# Patient Record
Sex: MALE | Race: WHITE | NOT HISPANIC OR LATINO | Employment: FULL TIME | URBAN - METROPOLITAN AREA
[De-identification: names, ages, dates, MRNs, and addresses within clinical notes are randomized per-mention and may not be internally consistent; named-entity substitution may affect disease eponyms.]

---
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
## 2021-01-02 ENCOUNTER — HOSPITAL ENCOUNTER (OUTPATIENT)
Facility: HOSPITAL | Age: 21
Setting detail: OBSERVATION
Discharge: HOME | End: 2021-01-05
Attending: EMERGENCY MEDICINE | Admitting: INTERNAL MEDICINE

## 2021-01-02 ENCOUNTER — APPOINTMENT (OUTPATIENT)
Dept: GENERAL RADIOLOGY | Facility: HOSPITAL | Age: 21
End: 2021-01-02

## 2021-01-02 DIAGNOSIS — J45.51 SEVERE PERSISTENT ASTHMA WITH ACUTE EXACERBATION: ICD-10-CM

## 2021-01-02 DIAGNOSIS — R06.03 ACUTE RESPIRATORY DISTRESS: Primary | ICD-10-CM

## 2021-01-02 LAB
ALBUMIN SERPL-MCNC: 4.5 G/DL (ref 3.5–5.2)
ALBUMIN/GLOB SERPL: 2.4 G/DL
ALP SERPL-CCNC: 71 U/L (ref 39–117)
ALT SERPL W P-5'-P-CCNC: 12 U/L (ref 1–41)
ANION GAP SERPL CALCULATED.3IONS-SCNC: 10 MMOL/L (ref 5–15)
AST SERPL-CCNC: 20 U/L (ref 1–40)
BASOPHILS # BLD AUTO: 0 10*3/MM3 (ref 0–0.2)
BASOPHILS NFR BLD AUTO: 0.1 % (ref 0–1.5)
BILIRUB SERPL-MCNC: 0.6 MG/DL (ref 0–1.2)
BUN SERPL-MCNC: 6 MG/DL (ref 6–20)
BUN/CREAT SERPL: 7.8 (ref 7–25)
CALCIUM SPEC-SCNC: 8.9 MG/DL (ref 8.6–10.5)
CHLORIDE SERPL-SCNC: 104 MMOL/L (ref 98–107)
CO2 SERPL-SCNC: 24 MMOL/L (ref 22–29)
CREAT SERPL-MCNC: 0.77 MG/DL (ref 0.76–1.27)
DEPRECATED RDW RBC AUTO: 42.9 FL (ref 37–54)
EOSINOPHIL # BLD AUTO: 0.1 10*3/MM3 (ref 0–0.4)
EOSINOPHIL NFR BLD AUTO: 1.3 % (ref 0.3–6.2)
ERYTHROCYTE [DISTWIDTH] IN BLOOD BY AUTOMATED COUNT: 12.7 % (ref 12.3–15.4)
GFR SERPL CREATININE-BSD FRML MDRD: 130 ML/MIN/1.73
GLOBULIN UR ELPH-MCNC: 1.9 GM/DL
GLUCOSE SERPL-MCNC: 186 MG/DL (ref 65–99)
HCT VFR BLD AUTO: 43.7 % (ref 37.5–51)
HGB BLD-MCNC: 15 G/DL (ref 13–17.7)
LYMPHOCYTES # BLD AUTO: 0.4 10*3/MM3 (ref 0.7–3.1)
LYMPHOCYTES NFR BLD AUTO: 7.4 % (ref 19.6–45.3)
MCH RBC QN AUTO: 33 PG (ref 26.6–33)
MCHC RBC AUTO-ENTMCNC: 34.3 G/DL (ref 31.5–35.7)
MCV RBC AUTO: 96.3 FL (ref 79–97)
MONOCYTES # BLD AUTO: 0.1 10*3/MM3 (ref 0.1–0.9)
MONOCYTES NFR BLD AUTO: 1.5 % (ref 5–12)
NEUTROPHILS NFR BLD AUTO: 4.9 10*3/MM3 (ref 1.7–7)
NEUTROPHILS NFR BLD AUTO: 89.7 % (ref 42.7–76)
NRBC BLD AUTO-RTO: 0 /100 WBC (ref 0–0.2)
PLATELET # BLD AUTO: 199 10*3/MM3 (ref 140–450)
PMV BLD AUTO: 8.8 FL (ref 6–12)
POTASSIUM SERPL-SCNC: 3.6 MMOL/L (ref 3.5–5.2)
PROT SERPL-MCNC: 6.4 G/DL (ref 6–8.5)
RBC # BLD AUTO: 4.54 10*6/MM3 (ref 4.14–5.8)
SARS-COV-2 RNA PNL SPEC NAA+PROBE: NORMAL
SODIUM SERPL-SCNC: 138 MMOL/L (ref 136–145)
WBC # BLD AUTO: 5.4 10*3/MM3 (ref 3.4–10.8)

## 2021-01-02 PROCEDURE — 85025 COMPLETE CBC W/AUTO DIFF WBC: CPT | Performed by: EMERGENCY MEDICINE

## 2021-01-02 PROCEDURE — 94799 UNLISTED PULMONARY SVC/PX: CPT

## 2021-01-02 PROCEDURE — G0378 HOSPITAL OBSERVATION PER HR: HCPCS

## 2021-01-02 PROCEDURE — 25010000002 EPINEPHRINE PER 0.1 MG: Performed by: EMERGENCY MEDICINE

## 2021-01-02 PROCEDURE — 25010000002 MAGNESIUM SULFATE 2 GM/50ML SOLUTION: Performed by: EMERGENCY MEDICINE

## 2021-01-02 PROCEDURE — 80053 COMPREHEN METABOLIC PANEL: CPT | Performed by: EMERGENCY MEDICINE

## 2021-01-02 PROCEDURE — 71045 X-RAY EXAM CHEST 1 VIEW: CPT

## 2021-01-02 PROCEDURE — 96375 TX/PRO/DX INJ NEW DRUG ADDON: CPT

## 2021-01-02 PROCEDURE — 87635 SARS-COV-2 COVID-19 AMP PRB: CPT | Performed by: EMERGENCY MEDICINE

## 2021-01-02 PROCEDURE — 96372 THER/PROPH/DIAG INJ SC/IM: CPT

## 2021-01-02 PROCEDURE — C9803 HOPD COVID-19 SPEC COLLECT: HCPCS

## 2021-01-02 PROCEDURE — 94640 AIRWAY INHALATION TREATMENT: CPT

## 2021-01-02 PROCEDURE — 25010000002 METHYLPREDNISOLONE PER 125 MG: Performed by: EMERGENCY MEDICINE

## 2021-01-02 PROCEDURE — 99285 EMERGENCY DEPT VISIT HI MDM: CPT

## 2021-01-02 PROCEDURE — 96365 THER/PROPH/DIAG IV INF INIT: CPT

## 2021-01-02 RX ORDER — ACETAMINOPHEN 325 MG/1
650 TABLET ORAL EVERY 6 HOURS PRN
Status: DISCONTINUED | OUTPATIENT
Start: 2021-01-02 | End: 2021-01-05 | Stop reason: HOSPADM

## 2021-01-02 RX ORDER — CALCIUM CARBONATE 200(500)MG
2 TABLET,CHEWABLE ORAL 3 TIMES DAILY PRN
Status: DISCONTINUED | OUTPATIENT
Start: 2021-01-02 | End: 2021-01-05 | Stop reason: HOSPADM

## 2021-01-02 RX ORDER — EPINEPHRINE 1 MG/ML
0.3 INJECTION, SOLUTION, CONCENTRATE INTRAVENOUS ONCE
Status: COMPLETED | OUTPATIENT
Start: 2021-01-02 | End: 2021-01-02

## 2021-01-02 RX ORDER — MAGNESIUM SULFATE HEPTAHYDRATE 40 MG/ML
2 INJECTION, SOLUTION INTRAVENOUS ONCE
Status: COMPLETED | OUTPATIENT
Start: 2021-01-02 | End: 2021-01-02

## 2021-01-02 RX ORDER — ZOLPIDEM TARTRATE 5 MG/1
5 TABLET ORAL NIGHTLY PRN
Status: DISCONTINUED | OUTPATIENT
Start: 2021-01-02 | End: 2021-01-05 | Stop reason: HOSPADM

## 2021-01-02 RX ORDER — METHYLPREDNISOLONE SODIUM SUCCINATE 125 MG/2ML
125 INJECTION, POWDER, LYOPHILIZED, FOR SOLUTION INTRAMUSCULAR; INTRAVENOUS ONCE
Status: COMPLETED | OUTPATIENT
Start: 2021-01-02 | End: 2021-01-02

## 2021-01-02 RX ORDER — IPRATROPIUM BROMIDE AND ALBUTEROL SULFATE 2.5; .5 MG/3ML; MG/3ML
3 SOLUTION RESPIRATORY (INHALATION) EVERY 4 HOURS
COMMUNITY
End: 2023-03-23

## 2021-01-02 RX ORDER — BUDESONIDE AND FORMOTEROL FUMARATE DIHYDRATE 160; 4.5 UG/1; UG/1
2 AEROSOL RESPIRATORY (INHALATION)
Status: DISCONTINUED | OUTPATIENT
Start: 2021-01-02 | End: 2021-01-03

## 2021-01-02 RX ORDER — SODIUM CHLORIDE 0.9 % (FLUSH) 0.9 %
10 SYRINGE (ML) INJECTION AS NEEDED
Status: DISCONTINUED | OUTPATIENT
Start: 2021-01-02 | End: 2021-01-05 | Stop reason: HOSPADM

## 2021-01-02 RX ORDER — IPRATROPIUM BROMIDE AND ALBUTEROL SULFATE 2.5; .5 MG/3ML; MG/3ML
3 SOLUTION RESPIRATORY (INHALATION) EVERY 4 HOURS
Status: DISCONTINUED | OUTPATIENT
Start: 2021-01-02 | End: 2021-01-03

## 2021-01-02 RX ORDER — METHYLPREDNISOLONE SODIUM SUCCINATE 125 MG/2ML
60 INJECTION, POWDER, LYOPHILIZED, FOR SOLUTION INTRAMUSCULAR; INTRAVENOUS EVERY 8 HOURS
Status: DISCONTINUED | OUTPATIENT
Start: 2021-01-03 | End: 2021-01-04

## 2021-01-02 RX ORDER — IPRATROPIUM BROMIDE AND ALBUTEROL SULFATE 2.5; .5 MG/3ML; MG/3ML
3 SOLUTION RESPIRATORY (INHALATION) EVERY 4 HOURS
Status: DISCONTINUED | OUTPATIENT
Start: 2021-01-02 | End: 2021-01-02

## 2021-01-02 RX ORDER — ONDANSETRON 2 MG/ML
4 INJECTION INTRAMUSCULAR; INTRAVENOUS EVERY 6 HOURS PRN
Status: DISCONTINUED | OUTPATIENT
Start: 2021-01-02 | End: 2021-01-05 | Stop reason: HOSPADM

## 2021-01-02 RX ORDER — ALBUTEROL SULFATE 2.5 MG/3ML
2.5 SOLUTION RESPIRATORY (INHALATION)
Status: COMPLETED | OUTPATIENT
Start: 2021-01-02 | End: 2021-01-02

## 2021-01-02 RX ORDER — IPRATROPIUM BROMIDE AND ALBUTEROL SULFATE 2.5; .5 MG/3ML; MG/3ML
3 SOLUTION RESPIRATORY (INHALATION) EVERY 4 HOURS PRN
Status: DISCONTINUED | OUTPATIENT
Start: 2021-01-02 | End: 2021-01-03

## 2021-01-02 RX ORDER — IPRATROPIUM BROMIDE AND ALBUTEROL SULFATE 2.5; .5 MG/3ML; MG/3ML
3 SOLUTION RESPIRATORY (INHALATION) ONCE
Status: COMPLETED | OUTPATIENT
Start: 2021-01-02 | End: 2021-01-02

## 2021-01-02 RX ORDER — IPRATROPIUM/ALBUTEROL SULFATE 20-100 MCG
1 MIST INHALER (GRAM) INHALATION 2 TIMES DAILY PRN
COMMUNITY
Start: 2020-12-21 | End: 2021-01-05 | Stop reason: HOSPADM

## 2021-01-02 RX ORDER — ALBUTEROL SULFATE 2.5 MG/3ML
2.5 SOLUTION RESPIRATORY (INHALATION) ONCE
Status: COMPLETED | OUTPATIENT
Start: 2021-01-02 | End: 2021-01-02

## 2021-01-02 RX ADMIN — ALBUTEROL SULFATE 2.5 MG: 2.5 SOLUTION RESPIRATORY (INHALATION) at 16:24

## 2021-01-02 RX ADMIN — EPINEPHRINE 0.3 MG: 1 INJECTION, SOLUTION, CONCENTRATE INTRAVENOUS at 20:33

## 2021-01-02 RX ADMIN — METHYLPREDNISOLONE SODIUM SUCCINATE 125 MG: 125 INJECTION, POWDER, FOR SOLUTION INTRAMUSCULAR; INTRAVENOUS at 18:32

## 2021-01-02 RX ADMIN — ALBUTEROL SULFATE 2.5 MG: 2.5 SOLUTION RESPIRATORY (INHALATION) at 20:21

## 2021-01-02 RX ADMIN — SODIUM CHLORIDE 1000 ML: 9 INJECTION, SOLUTION INTRAVENOUS at 18:13

## 2021-01-02 RX ADMIN — ALBUTEROL SULFATE 2.5 MG: 2.5 SOLUTION RESPIRATORY (INHALATION) at 16:22

## 2021-01-02 RX ADMIN — IPRATROPIUM BROMIDE AND ALBUTEROL SULFATE 3 ML: 2.5; .5 SOLUTION RESPIRATORY (INHALATION) at 16:22

## 2021-01-02 RX ADMIN — IPRATROPIUM BROMIDE AND ALBUTEROL SULFATE 3 ML: 2.5; .5 SOLUTION RESPIRATORY (INHALATION) at 21:33

## 2021-01-02 RX ADMIN — IPRATROPIUM BROMIDE AND ALBUTEROL SULFATE 3 ML: 2.5; .5 SOLUTION RESPIRATORY (INHALATION) at 23:26

## 2021-01-02 RX ADMIN — IPRATROPIUM BROMIDE AND ALBUTEROL SULFATE 3 ML: 2.5; .5 SOLUTION RESPIRATORY (INHALATION) at 18:13

## 2021-01-02 RX ADMIN — MAGNESIUM SULFATE HEPTAHYDRATE 2 G: 40 INJECTION, SOLUTION INTRAVENOUS at 16:12

## 2021-01-02 NOTE — ED NOTES
PT states improvment in SOB.  Pt receiving breathing tx at this time.     Neno Treadwell LPN  01/02/21 1640

## 2021-01-02 NOTE — ED PROVIDER NOTES
Subjective   Chief complaint shortness of breath    History of present illness this is a 20-year-old male with a history of asthma who states that he has had 1 day history of increasing asthma.  He denies any fever chills or sweats has had a sort of a nonproductive cough for about a week.  No body aches no ill exposures or foreign travels no leg pain or swelling no chest pain neck arm or jaw pain.  Today uses inhalers without relief.  Symptoms are moderate worse with movement better with rest and ongoing just today over the last 5 or 6 hours.  No chest pain.  The patient states that this happens to him every once while work he has this flare that he cannot get under control.          Review of Systems   Constitutional: Negative for chills and fever.   HENT: Negative for congestion and sinus pressure.    Eyes: Negative for photophobia and visual disturbance.   Respiratory: Positive for cough and shortness of breath. Negative for chest tightness.    Cardiovascular: Negative for chest pain and leg swelling.   Gastrointestinal: Negative for abdominal pain and vomiting.   Endocrine: Negative for cold intolerance and heat intolerance.   Genitourinary: Negative for difficulty urinating and dysuria.   Musculoskeletal: Negative for arthralgias and back pain.   Skin: Negative for color change and pallor.   Neurological: Negative for dizziness and light-headedness.   Psychiatric/Behavioral: Negative for agitation and behavioral problems.       Past Medical History:   Diagnosis Date   • Asthma    • COPD (chronic obstructive pulmonary disease) (CMS/[facility])        No Known Allergies    Past Surgical History:   Procedure Laterality Date   • EYE SURGERY         No family history on file.    Social History     Socioeconomic History   • Marital status: Single     Spouse name: Not on file   • Number of children: Not on file   • Years of education: Not on file   • Highest education level: Not on file   Tobacco Use   • Smoking status:  Never Smoker   • Smokeless tobacco: Never Used   Substance and Sexual Activity   • Alcohol use: Not Currently     Frequency: Never   • Drug use: Yes     Types: Marijuana   • Sexual activity: Defer   Social History Narrative    ** Merged History Encounter **            Prior to Admission medications    Medication Sig Start Date End Date Taking? Authorizing Provider   albuterol sulfate  (90 Base) MCG/ACT inhaler Inhale 2 puffs Every 6 (Six) Hours As Needed for Wheezing or Shortness of Air. 8/6/20   Rudy Liao MD   albuterol sulfate  (90 Base) MCG/ACT inhaler Inhale 2 puffs Every 4 (Four) Hours As Needed for Wheezing or Shortness of Air. 9/23/20   Gregorio Linares MD   budesonide-formoterol (SYMBICORT) 160-4.5 MCG/ACT inhaler Inhale 2 puffs 2 (Two) Times a Day. 9/23/20   Gregorio Linares MD   cephalexin (KEFLEX) 500 MG capsule Take 1 capsule by mouth 3 (Three) Times a Day. 8/6/20   Rudy Liao MD   ipratropium-albuterol (DUO-NEB) 0.5-2.5 mg/3 ml nebulizer Take 3 mL by nebulization 4 (Four) Times a Day for 25 doses. 9/26/20 10/3/20  Crow Barger MD   methylPREDNISolone (MEDROL) 4 MG dose pack Take as directed on package instructions. 9/23/20   Gregorio Linares MD   methylPREDNISolone (MEDROL) 4 MG dose pack Take as directed on package instructions. 9/26/20   Crow Barger MD   naproxen (NAPROSYN) 375 MG tablet Take 1 tablet by mouth 2 (Two) Times a Day As Needed for Moderate Pain . 11/22/20   Jsoe Armando Aquino MD   traMADol (ULTRAM) 50 MG tablet Take 1 tablet by mouth Every 6 (Six) Hours As Needed for Moderate Pain  or Severe Pain . 8/6/20   Rudy Liao MD     Patient is only on inhalers currently    Objective   Physical Exam  20-year-old awake alert moderate distress HEENT extraocular muscles intact pupils equal reactive mouth clear without exudate no stridor neck supple no adenopathy no JVD lungs diffuse scattered wheezes throughout no retractions heart regular without  murmur abdomen soft without tenderness no pulsatile masses extremities no edema cords or Homans' sign no evidence of DVT.  Patient's awake alert and follows commands motor strength normal without focal weakness  Procedures           ED Course      Results for orders placed or performed during the hospital encounter of 01/02/21   COVID-19, ABBOTT IN-HOUSE,NASAL Swab (NO TRANSPORT MEDIA) 2 HR TAT - Swab, Nasopharynx    Specimen: Nasopharynx; Swab   Result Value Ref Range    COVID19 Presumptive Negative Presumptive Negative - Ref. Range     Xr Chest 1 View    Result Date: 1/2/2021   1. No acute cardiopulmonary disease.   Electronically Signed By-Gibran Wright MD On:1/2/2021 5:13 PM This report was finalized on [id number] by  Gibran Wright MD.    Medications   sodium chloride 0.9 % flush 10 mL (has no administration in time range)   budesonide-formoterol (SYMBICORT) 160-4.5 MCG/ACT inhaler 2 puff (0 puffs Inhalation Hold 1/2/21 2033)   methylPREDNISolone sodium succinate (SOLU-Medrol) injection 60 mg (has no administration in time range)   acetaminophen (TYLENOL) tablet 650 mg (has no administration in time range)   ondansetron (ZOFRAN) injection 4 mg (has no administration in time range)   zolpidem (AMBIEN) tablet 5 mg (has no administration in time range)   calcium carbonate (TUMS) chewable tablet 500 mg (200 mg elemental) (has no administration in time range)   ipratropium-albuterol (DUO-NEB) nebulizer solution 3 mL (has no administration in time range)   ipratropium-albuterol (DUO-NEB) nebulizer solution 3 mL (has no administration in time range)   ipratropium-albuterol (DUO-NEB) nebulizer solution 3 mL (3 mL Nebulization Given 1/2/21 1622)   albuterol (PROVENTIL) nebulizer solution 0.083% 2.5 mg/3mL (2.5 mg Nebulization Given 1/2/21 1624)   magnesium sulfate 2g/50 mL (PREMIX) infusion (0 g Intravenous Stopped 1/2/21 1730)   ipratropium-albuterol (DUO-NEB) nebulizer solution 3 mL (3 mL Nebulization Given  1/2/21 1813)   sodium chloride 0.9 % bolus 1,000 mL (0 mL Intravenous Stopped 1/2/21 1911)   methylPREDNISolone sodium succinate (SOLU-Medrol) injection 125 mg (125 mg Intravenous Given 1/2/21 1832)   albuterol (PROVENTIL) nebulizer solution 0.083% 2.5 mg/3mL (2.5 mg Nebulization Given 1/2/21 2021)   EPINEPHrine PF (ADRENALIN) injection 0.3 mg (0.3 mg Intramuscular Given 1/2/21 2033)                                            MDM  Number of Diagnoses or Management Options  Acute respiratory distress:   Severe persistent asthma with acute exacerbation:   Diagnosis management comments: Ankle decision make.  Patient IV status placed on the monitor he was given a DuoNeb x1 and albuterol x2.  Magnesium 2 g IV.  He had the above exam evaluation COVID-19 was negative patient chest x-ray was negative.  The patient continued to have diffuse wheezing.  Although improved his on the oxygen at this point.  He initially came in on nonrebreather by EMS and his sats were the 100% although they reported they were they upper 80s upon arrival.  The patient was switched to a nasal cannula sats are 97% at 2 L.  The patient was given additional DuoNeb.  His oxygen was discontinued.  His sats fell back down to about 93% he developed diffuse scattered wheezing again.  He was given additional albuterol given epi 0.3 cc IM.  Given IV normal saline bolus as well.  The patient was made aware of the findings labs will be sent I do not see evidence suggest acute DVT or pulmonary embolism he has no risk factors.  And is no leg pain or swelling and he has had this in the past where he states he cannot get it under control.  He looks well no evidence of sepsis.  There is no other rash or hives.  Tari Kebede notified for Dr. Ahmadi.  The patient was placed in the hospital for further care.  Stable otherwise unremarkable ER course initially improved but reoccurrence.  Labs will be obtained and are currently pending      Final diagnoses:   Acute  respiratory distress   Severe persistent asthma with acute exacerbation            Huber Rios MD  01/02/21 2053

## 2021-01-03 LAB
ALBUMIN SERPL-MCNC: 4.8 G/DL (ref 3.5–5.2)
ALBUMIN/GLOB SERPL: 2.2 G/DL
ALP SERPL-CCNC: 73 U/L (ref 39–117)
ALT SERPL W P-5'-P-CCNC: 15 U/L (ref 1–41)
ANION GAP SERPL CALCULATED.3IONS-SCNC: 13 MMOL/L (ref 5–15)
ARTERIAL PATENCY WRIST A: POSITIVE
AST SERPL-CCNC: 23 U/L (ref 1–40)
ATMOSPHERIC PRESS: ABNORMAL MM[HG]
BASE EXCESS BLDA CALC-SCNC: -1.5 MMOL/L (ref 0–3)
BASOPHILS # BLD AUTO: 0 10*3/MM3 (ref 0–0.2)
BASOPHILS NFR BLD AUTO: 0 % (ref 0–1.5)
BDY SITE: ABNORMAL
BILIRUB SERPL-MCNC: 0.6 MG/DL (ref 0–1.2)
BUN SERPL-MCNC: 9 MG/DL (ref 6–20)
BUN/CREAT SERPL: 12.5 (ref 7–25)
CALCIUM SPEC-SCNC: 9.2 MG/DL (ref 8.6–10.5)
CHLORIDE SERPL-SCNC: 104 MMOL/L (ref 98–107)
CO2 BLDA-SCNC: 25 MMOL/L (ref 22–29)
CO2 SERPL-SCNC: 23 MMOL/L (ref 22–29)
CREAT SERPL-MCNC: 0.72 MG/DL (ref 0.76–1.27)
DEPRECATED RDW RBC AUTO: 44.6 FL (ref 37–54)
EOSINOPHIL # BLD AUTO: 0 10*3/MM3 (ref 0–0.4)
EOSINOPHIL NFR BLD AUTO: 0 % (ref 0.3–6.2)
ERYTHROCYTE [DISTWIDTH] IN BLOOD BY AUTOMATED COUNT: 13.1 % (ref 12.3–15.4)
GFR SERPL CREATININE-BSD FRML MDRD: 141 ML/MIN/1.73
GLOBULIN UR ELPH-MCNC: 2.2 GM/DL
GLUCOSE SERPL-MCNC: 140 MG/DL (ref 65–99)
HCO3 BLDA-SCNC: 23.7 MMOL/L (ref 21–28)
HCT VFR BLD AUTO: 46.1 % (ref 37.5–51)
HEMODILUTION: NO
HGB BLD-MCNC: 15.8 G/DL (ref 13–17.7)
INHALED O2 CONCENTRATION: 38 %
LYMPHOCYTES # BLD AUTO: 0.4 10*3/MM3 (ref 0.7–3.1)
LYMPHOCYTES NFR BLD AUTO: 5.3 % (ref 19.6–45.3)
MCH RBC QN AUTO: 33.3 PG (ref 26.6–33)
MCHC RBC AUTO-ENTMCNC: 34.3 G/DL (ref 31.5–35.7)
MCV RBC AUTO: 97.3 FL (ref 79–97)
MODALITY: ABNORMAL
MONOCYTES # BLD AUTO: 0.1 10*3/MM3 (ref 0.1–0.9)
MONOCYTES NFR BLD AUTO: 0.8 % (ref 5–12)
NEUTROPHILS NFR BLD AUTO: 6.8 10*3/MM3 (ref 1.7–7)
NEUTROPHILS NFR BLD AUTO: 93.9 % (ref 42.7–76)
NRBC BLD AUTO-RTO: 0.1 /100 WBC (ref 0–0.2)
PCO2 BLDA: 40.9 MM HG (ref 35–48)
PH BLDA: 7.37 PH UNITS (ref 7.35–7.45)
PLATELET # BLD AUTO: 194 10*3/MM3 (ref 140–450)
PMV BLD AUTO: 9.4 FL (ref 6–12)
PO2 BLDA: 106.1 MM HG (ref 83–108)
POTASSIUM SERPL-SCNC: 3.8 MMOL/L (ref 3.5–5.2)
PROT SERPL-MCNC: 7 G/DL (ref 6–8.5)
RBC # BLD AUTO: 4.74 10*6/MM3 (ref 4.14–5.8)
SAO2 % BLDCOA: 97.9 % (ref 94–98)
SODIUM SERPL-SCNC: 140 MMOL/L (ref 136–145)
WBC # BLD AUTO: 7.2 10*3/MM3 (ref 3.4–10.8)

## 2021-01-03 PROCEDURE — 85025 COMPLETE CBC W/AUTO DIFF WBC: CPT | Performed by: NURSE PRACTITIONER

## 2021-01-03 PROCEDURE — 25010000002 DIPHENHYDRAMINE PER 50 MG: Performed by: NURSE PRACTITIONER

## 2021-01-03 PROCEDURE — 94799 UNLISTED PULMONARY SVC/PX: CPT

## 2021-01-03 PROCEDURE — 25010000002 AMINOPHYLLINE PER 250 MG: Performed by: INTERNAL MEDICINE

## 2021-01-03 PROCEDURE — G0378 HOSPITAL OBSERVATION PER HR: HCPCS

## 2021-01-03 PROCEDURE — 96375 TX/PRO/DX INJ NEW DRUG ADDON: CPT

## 2021-01-03 PROCEDURE — 82803 BLOOD GASES ANY COMBINATION: CPT

## 2021-01-03 PROCEDURE — 94640 AIRWAY INHALATION TREATMENT: CPT

## 2021-01-03 PROCEDURE — 25010000002 METHYLPREDNISOLONE PER 125 MG: Performed by: NURSE PRACTITIONER

## 2021-01-03 PROCEDURE — 80053 COMPREHEN METABOLIC PANEL: CPT | Performed by: NURSE PRACTITIONER

## 2021-01-03 PROCEDURE — 96376 TX/PRO/DX INJ SAME DRUG ADON: CPT

## 2021-01-03 PROCEDURE — 36600 WITHDRAWAL OF ARTERIAL BLOOD: CPT

## 2021-01-03 RX ORDER — MONTELUKAST SODIUM 10 MG/1
10 TABLET ORAL NIGHTLY
Status: DISCONTINUED | OUTPATIENT
Start: 2021-01-03 | End: 2021-01-05 | Stop reason: HOSPADM

## 2021-01-03 RX ORDER — AZITHROMYCIN 250 MG/1
250 TABLET, FILM COATED ORAL
Status: DISCONTINUED | OUTPATIENT
Start: 2021-01-03 | End: 2021-01-05 | Stop reason: HOSPADM

## 2021-01-03 RX ORDER — DIPHENHYDRAMINE HYDROCHLORIDE 50 MG/ML
25 INJECTION INTRAMUSCULAR; INTRAVENOUS EVERY 6 HOURS PRN
Status: DISCONTINUED | OUTPATIENT
Start: 2021-01-03 | End: 2021-01-03

## 2021-01-03 RX ORDER — AMINOPHYLLINE DIHYDRATE 25 MG/ML
150 INJECTION, SOLUTION INTRAVENOUS ONCE
Status: DISCONTINUED | OUTPATIENT
Start: 2021-01-03 | End: 2021-01-03

## 2021-01-03 RX ORDER — DIPHENHYDRAMINE HYDROCHLORIDE 50 MG/ML
25 INJECTION INTRAMUSCULAR; INTRAVENOUS ONCE
Status: COMPLETED | OUTPATIENT
Start: 2021-01-03 | End: 2021-01-03

## 2021-01-03 RX ORDER — IPRATROPIUM BROMIDE AND ALBUTEROL SULFATE 2.5; .5 MG/3ML; MG/3ML
3 SOLUTION RESPIRATORY (INHALATION)
Status: DISCONTINUED | OUTPATIENT
Start: 2021-01-03 | End: 2021-01-03

## 2021-01-03 RX ORDER — FAMOTIDINE 20 MG/1
20 TABLET, FILM COATED ORAL
Status: DISCONTINUED | OUTPATIENT
Start: 2021-01-03 | End: 2021-01-05 | Stop reason: HOSPADM

## 2021-01-03 RX ORDER — BUDESONIDE 0.5 MG/2ML
0.5 INHALANT ORAL
Status: DISCONTINUED | OUTPATIENT
Start: 2021-01-03 | End: 2021-01-05 | Stop reason: HOSPADM

## 2021-01-03 RX ORDER — IPRATROPIUM BROMIDE AND ALBUTEROL SULFATE 2.5; .5 MG/3ML; MG/3ML
3 SOLUTION RESPIRATORY (INHALATION)
Status: DISCONTINUED | OUTPATIENT
Start: 2021-01-03 | End: 2021-01-05 | Stop reason: HOSPADM

## 2021-01-03 RX ORDER — ALBUTEROL SULFATE 2.5 MG/3ML
2.5 SOLUTION RESPIRATORY (INHALATION)
Status: DISCONTINUED | OUTPATIENT
Start: 2021-01-03 | End: 2021-01-05 | Stop reason: HOSPADM

## 2021-01-03 RX ADMIN — BUDESONIDE 0.5 MG: 0.5 INHALANT RESPIRATORY (INHALATION) at 19:19

## 2021-01-03 RX ADMIN — METHYLPREDNISOLONE SODIUM SUCCINATE 60 MG: 125 INJECTION, POWDER, FOR SOLUTION INTRAMUSCULAR; INTRAVENOUS at 17:45

## 2021-01-03 RX ADMIN — AZITHROMYCIN DIHYDRATE 250 MG: 250 TABLET, FILM COATED ORAL at 15:46

## 2021-01-03 RX ADMIN — IPRATROPIUM BROMIDE AND ALBUTEROL SULFATE 3 ML: 2.5; .5 SOLUTION RESPIRATORY (INHALATION) at 14:20

## 2021-01-03 RX ADMIN — IPRATROPIUM BROMIDE AND ALBUTEROL SULFATE 3 ML: 2.5; .5 SOLUTION RESPIRATORY (INHALATION) at 04:29

## 2021-01-03 RX ADMIN — AMINOPHYLLINE 150 MG: 25 INJECTION, SOLUTION INTRAVENOUS at 15:46

## 2021-01-03 RX ADMIN — FAMOTIDINE 20 MG: 20 TABLET ORAL at 17:45

## 2021-01-03 RX ADMIN — IPRATROPIUM BROMIDE AND ALBUTEROL SULFATE 3 ML: 2.5; .5 SOLUTION RESPIRATORY (INHALATION) at 09:55

## 2021-01-03 RX ADMIN — IPRATROPIUM BROMIDE AND ALBUTEROL SULFATE 3 ML: 2.5; .5 SOLUTION RESPIRATORY (INHALATION) at 19:19

## 2021-01-03 RX ADMIN — Medication 10 ML: at 20:27

## 2021-01-03 RX ADMIN — IPRATROPIUM BROMIDE AND ALBUTEROL SULFATE: 2.5; .5 SOLUTION RESPIRATORY (INHALATION) at 03:06

## 2021-01-03 RX ADMIN — Medication 10 ML: at 15:46

## 2021-01-03 RX ADMIN — MONTELUKAST 10 MG: 10 TABLET, FILM COATED ORAL at 20:27

## 2021-01-03 RX ADMIN — METHYLPREDNISOLONE SODIUM SUCCINATE 60 MG: 125 INJECTION, POWDER, FOR SOLUTION INTRAMUSCULAR; INTRAVENOUS at 01:28

## 2021-01-03 RX ADMIN — METHYLPREDNISOLONE SODIUM SUCCINATE 60 MG: 125 INJECTION, POWDER, FOR SOLUTION INTRAMUSCULAR; INTRAVENOUS at 09:53

## 2021-01-03 RX ADMIN — IPRATROPIUM BROMIDE AND ALBUTEROL SULFATE 3 ML: .5; 3 SOLUTION RESPIRATORY (INHALATION) at 04:29

## 2021-01-03 RX ADMIN — DIPHENHYDRAMINE HYDROCHLORIDE 25 MG: 50 INJECTION, SOLUTION INTRAMUSCULAR; INTRAVENOUS at 05:47

## 2021-01-03 RX ADMIN — BUDESONIDE AND FORMOTEROL FUMARATE DIHYDRATE 2 PUFF: 160; 4.5 AEROSOL RESPIRATORY (INHALATION) at 07:54

## 2021-01-03 NOTE — H&P
Patient Care Team:  Aileen Ahmadi MD as PCP - General (Family Medicine)    Chief complaint SOA, Wheezing    Subjective     Patient is a 20 y.o. male presents with a history of asthma who states that he has had 1 day history of increasing asthma.  He denies any fever chills or sweats has had a sort of a nonproductive cough for about a week.  No body aches no ill exposures or foreign travels no leg pain or swelling no chest pain neck arm or jaw pain.  Today uses inhalers without relief.  Symptoms are moderate worse with movement better with rest and ongoing just today over the last 5 or 6 hours.  No chest pain.  The patient states that this happens to him every once while work he has this flare that he cannot get under control. In the Er he received several neb Txs as well as IV steroids with little improvement and was admitted. He has not seen a pulmonologist in past. This morning he is feeling better as long as he does not move around or cough to much. Denies CP, N/V, Abd pain     Review of Systems   Pertinent items are noted in HPI, all other systems reviewed and negative    History  Past Medical History:   Diagnosis Date   • Asthma    • COPD (chronic obstructive pulmonary disease) (CMS/[facility])      Past Surgical History:   Procedure Laterality Date   • EYE SURGERY       No family history on file.  Social History     Tobacco Use   • Smoking status: Never Smoker   • Smokeless tobacco: Never Used   Substance Use Topics   • Alcohol use: Yes     Frequency: Never     Comment: drinks 1/5 every 3 days   • Drug use: Not Currently     Types: Marijuana     Allergies:  Patient has no known allergies.    Objective     Vital Signs  Temp:  [97.6 °F (36.4 °C)-97.9 °F (36.6 °C)] 97.7 °F (36.5 °C)  Heart Rate:  [] 110  Resp:  [18-32] 20  BP: (114-149)/(65-85) 121/65      Physical Exam:      General Appearance:    Alert, cooperative, in no acute distress   Head:    Normocephalic, without obvious abnormality, atraumatic    Eyes:            Lids and lashes normal, conjunctivae and sclerae normal, no   icterus, no pallor,   Ears:    Ears appear intact with no abnormalities noted   Throat:   No oral lesions, no thrush, oral mucosa moist   Neck:   No adenopathy, supple, trachea midline, no thyromegaly   Back:     No kyphosis present, no scoliosis present, no skin lesions,      erythema or scars, no tenderness to percussion or                   palpation,   range of motion normal   Lungs:     Tight to auscultation, faint scatterred wheezes,respirations regular, even and unlabored    Heart:    Regular rhythm and normal rate, normal S1 and S2, no            murmur, no gallop, no rub, no click   Chest Wall:    No abnormalities observed   Abdomen:     Normal bowel sounds, no masses, no organomegaly, soft        non-tender, non-distended, no guarding, no rebound                tenderness   Rectal:     Deferred   Extremities:   Moves all extremities well, no edema, no cyanosis, no             redness   Pulses:   Pulses palpable and equal bilaterally   Skin:   No bleeding, bruising or rash   Lymph nodes:   No palpable adenopathy   Neurologic:   Cranial nerves 2 - 12 grossly intact, sensation intact       Results Review:    I reviewed the patient's new clinical results.  I reviewed the patient's new imaging results and agree with the interpretation.    Assessment/Plan     1. Hypoxia/Acute resp distress-Improved-cont IV steroids, scheduled neb Txs, consult pulmonology  2. Asthma-not controlled on current home meds-consult pulmonology    Expected Length of Stay 2 days    I discussed the patients findings and my recommendations with patient and nursing staff.     Melania Kebede, APRN  01/03/21  05:45 EST

## 2021-01-03 NOTE — PLAN OF CARE
Goal Outcome Evaluation:  Plan of Care Reviewed With: patient  Progress: no change  Outcome Summary: Patient admitted overnight from ER for asthma exacerbation.  Patient on 2L O2 via nasal cannula.  Patient with reaction to adhesive tape overnight; resolved with shower and benadryl.  Patient continues to have cough and occasional asthma attacks relieved with duo nebs.  Will continue to monitor.

## 2021-01-03 NOTE — NURSING NOTE
Patient sitting on bed in tripod position and noticeably struggling to breathe.  Patient had accessory muscle use and SpO2 was 88% on room air.  Oxygen placed back on patient and respiratory called for a breathing treatment.  Nurse also placed a call out to patient's provider.  See orders.  Patient also noted to have a rash on his chest and buttocks/hip area.  The rash is erythematous and raised.  Patient taken to shower and the rash has improved somewhat.  Some of the rash is located around where the patient's telemetry leads were located.  Another call was placed at 0530 to this regard.  See orders.

## 2021-01-03 NOTE — CONSULTS
Group: [facility]         CONSULT NOTE    Patient Identification:  Gregorio Winslow Jr.  20 y.o.  male  2000  [id number]            Requesting physician: Attending physician    Reason for Consultation: Asthma exacerbation        History of Present Illness:    20 year old male who presented to ED with shortness of air unrelieved by his inhalers at home.   In the ED, he was given a DuoNeb x1 and albuterol x2.  Magnesium 2 g IV.  COVID-19 was negative patient chest x-ray was negative.  The patient continued to have diffuse wheezing.  Although improved his on the oxygen; 1 liter NS bolus     Assessment:    Acute respiratory distress secondary to asthma exacerbation  EMS had patient on NRB due to saturation in 80s upon arrival    COVID 19 negative 1/2/21    Recommendations:    Aminophylline IV  Azithromycin  singulair  steroids solumederol 60 mg IV q 8  Duo nebs q 6  Pulmicort      Review of Sytems:  Review of Systems   Respiratory: Positive for cough and shortness of breath.        Past Medical History:  Past Medical History:   Diagnosis Date   • Asthma    • COPD (chronic obstructive pulmonary disease) (CMS/[facility])        Past Surgical History:  Past Surgical History:   Procedure Laterality Date   • EYE SURGERY          Home Meds:  Medications Prior to Admission   Medication Sig Dispense Refill Last Dose   • albuterol sulfate  (90 Base) MCG/ACT inhaler Inhale 2 puffs Every 6 (Six) Hours As Needed for Wheezing or Shortness of Air. 1 inhaler 0    • budesonide-formoterol (SYMBICORT) 160-4.5 MCG/ACT inhaler Inhale 2 puffs 2 (Two) Times a Day. 1 inhaler 0    • Combivent Respimat  MCG/ACT inhaler Inhale 1 puff 2 (Two) Times a Day As Needed.      • ipratropium-albuterol (DUO-NEB) 0.5-2.5 mg/3 ml nebulizer Take 3 mL by nebulization Every 4 (Four) Hours.          Allergies:  Allergies   Allergen Reactions   • Adhesive Tape Hives       Social History:   Social History     Socioeconomic History   • Marital  "status: Single     Spouse name: Not on file   • Number of children: Not on file   • Years of education: Not on file   • Highest education level: Not on file   Tobacco Use   • Smoking status: Never Smoker   • Smokeless tobacco: Never Used   Substance and Sexual Activity   • Alcohol use: Yes     Frequency: Never     Comment: drinks 1/5 every 3 days   • Drug use: Not Currently     Types: Marijuana   • Sexual activity: Defer   Social History Narrative    ** Merged History Encounter **            Family History:  No family history on file.    Physical Exam:  /65 (BP Location: Left arm, Patient Position: Lying)   Pulse 115   Temp 97.7 °F (36.5 °C) (Oral)   Resp 22   Ht 188 cm (74\")   Wt 56.4 kg (124 lb 4.8 oz)   SpO2 97%   BMI 15.96 kg/m²  Body mass index is 15.96 kg/m². 97% 56.4 kg (124 lb 4.8 oz)  Physical Exam  Pulmonary:      Breath sounds: Wheezing, rhonchi and rales present.         LABS:  Lab Results   Component Value Date    CALCIUM 9.2 01/03/2021     Results from last 7 days   Lab Units 01/03/21  0441 01/02/21  2122   SODIUM mmol/L 140 138   POTASSIUM mmol/L 3.8 3.6   CHLORIDE mmol/L 104 104   CO2 mmol/L 23.0 24.0   BUN mg/dL 9 6   CREATININE mg/dL 0.72* 0.77   GLUCOSE mg/dL 140* 186*   CALCIUM mg/dL 9.2 8.9   WBC 10*3/mm3 7.20 5.40   HEMOGLOBIN g/dL 15.8 15.0   PLATELETS 10*3/mm3 194 199   ALT (SGPT) U/L 15 12   AST (SGOT) U/L 23 20     No results found for: CKTOTAL, CKMB, CKMBINDEX, TROPONINI, TROPONINT              Results from last 7 days   Lab Units 01/03/21  0503   PH, ARTERIAL pH units 7.371   PCO2, ARTERIAL mm Hg 40.9   PO2 ART mm Hg 106.1   O2 SATURATION ART % 97.9   MODALITY  Cannula                 No results found for: TSH  Estimated Creatinine Clearance: 131.6 mL/min (A) (by C-G formula based on SCr of 0.72 mg/dL (L)).         Imaging:  Imaging Results (Last 24 Hours)     Procedure Component Value Units Date/Time    XR Chest 1 View [[id number]] Collected: 01/02/21 7635     Updated: " 01/02/21 1715    Narrative:      XR CHEST 1 VW-     Date of Exam: 1/2/2021 4:13 PM     Indication: soa.     Comparison: None available.     Technique: A single view of the chest was obtained.     FINDINGS:      The heart size and pulmonary vessels are within normal limits. The  lungs are clear bilaterally. There are no pleural effusions. Bony thorax  is unremarkable.                Impression:         1. No acute cardiopulmonary disease.        Electronically Signed By-Gibran Wright MD On:1/2/2021 5:13 PM  This report was finalized on [id number] by  Gibran Wright MD.            Current Meds:   SCHEDULE  budesonide-formoterol, 2 puff, Inhalation, BID - RT  methylPREDNISolone sodium succinate, 60 mg, Intravenous, Q8H      Infusions     PRNs  •  acetaminophen  •  calcium carbonate  •  ipratropium-albuterol  •  ipratropium-albuterol  •  ondansetron  •  [COMPLETED] Insert peripheral IV **AND** sodium chloride  •  zolpidem        Kori Ponce, APRN  1/3/2021  13:52 EST

## 2021-01-03 NOTE — ED NOTES
PT resting in bed.  Pt states that cough is returning, post breathing tx.  Pt states that slight SOB returning also, but manageable.  Pt O2 sat is holding at 94% on RA.     Neno Treadwell LPN  01/02/21 1929

## 2021-01-04 PROCEDURE — G0378 HOSPITAL OBSERVATION PER HR: HCPCS

## 2021-01-04 PROCEDURE — 94799 UNLISTED PULMONARY SVC/PX: CPT

## 2021-01-04 PROCEDURE — 96376 TX/PRO/DX INJ SAME DRUG ADON: CPT

## 2021-01-04 PROCEDURE — 25010000002 METHYLPREDNISOLONE PER 125 MG: Performed by: NURSE PRACTITIONER

## 2021-01-04 PROCEDURE — 25010000002 METHYLPREDNISOLONE PER 40 MG: Performed by: INTERNAL MEDICINE

## 2021-01-04 RX ORDER — METHYLPREDNISOLONE SODIUM SUCCINATE 40 MG/ML
40 INJECTION, POWDER, LYOPHILIZED, FOR SOLUTION INTRAMUSCULAR; INTRAVENOUS EVERY 12 HOURS
Status: DISCONTINUED | OUTPATIENT
Start: 2021-01-04 | End: 2021-01-05 | Stop reason: HOSPADM

## 2021-01-04 RX ADMIN — MONTELUKAST 10 MG: 10 TABLET, FILM COATED ORAL at 20:44

## 2021-01-04 RX ADMIN — IPRATROPIUM BROMIDE AND ALBUTEROL SULFATE 3 ML: 2.5; .5 SOLUTION RESPIRATORY (INHALATION) at 19:21

## 2021-01-04 RX ADMIN — BUDESONIDE 0.5 MG: 0.5 INHALANT RESPIRATORY (INHALATION) at 19:21

## 2021-01-04 RX ADMIN — Medication 10 ML: at 08:08

## 2021-01-04 RX ADMIN — IPRATROPIUM BROMIDE AND ALBUTEROL SULFATE 3 ML: 2.5; .5 SOLUTION RESPIRATORY (INHALATION) at 07:08

## 2021-01-04 RX ADMIN — BUDESONIDE 0.5 MG: 0.5 INHALANT RESPIRATORY (INHALATION) at 07:08

## 2021-01-04 RX ADMIN — AZITHROMYCIN DIHYDRATE 250 MG: 250 TABLET, FILM COATED ORAL at 08:08

## 2021-01-04 RX ADMIN — FAMOTIDINE 20 MG: 20 TABLET ORAL at 08:07

## 2021-01-04 RX ADMIN — IPRATROPIUM BROMIDE AND ALBUTEROL SULFATE 3 ML: 2.5; .5 SOLUTION RESPIRATORY (INHALATION) at 04:24

## 2021-01-04 RX ADMIN — IPRATROPIUM BROMIDE AND ALBUTEROL SULFATE 3 ML: 2.5; .5 SOLUTION RESPIRATORY (INHALATION) at 11:30

## 2021-01-04 RX ADMIN — FAMOTIDINE 20 MG: 20 TABLET ORAL at 16:04

## 2021-01-04 RX ADMIN — METHYLPREDNISOLONE SODIUM SUCCINATE 60 MG: 125 INJECTION, POWDER, FOR SOLUTION INTRAMUSCULAR; INTRAVENOUS at 01:26

## 2021-01-04 RX ADMIN — ALBUTEROL SULFATE 2.5 MG: 2.5 SOLUTION RESPIRATORY (INHALATION) at 15:52

## 2021-01-04 RX ADMIN — METHYLPREDNISOLONE SODIUM SUCCINATE 40 MG: 40 INJECTION, POWDER, FOR SOLUTION INTRAMUSCULAR; INTRAVENOUS at 13:38

## 2021-01-04 NOTE — PLAN OF CARE
Goal Outcome Evaluation:  Plan of Care Reviewed With: patient  Progress: no change  Outcome Summary: no respiratory distress today. 02 cont. to be weaned & on .5L at this time. Very hesitant to remove. Steroids decreased.

## 2021-01-04 NOTE — PROGRESS NOTES
"Nutrition Services    Patient Name: Gregorio Winslow Jr.  YOB: 2000  MRN: [id number]  Admission date: 1/2/2021    Comments:  Continue Regular diet as tolerated.  Close monitoring of weight status.    PPE Documentation        PPE Worn By Provider mask and eye protection   PPE Worn By Patient  none     CLINICAL NUTRITION ASSESSMENT      Reason for Assessment 1/4/21: Low BMI     H&P:  20 y.o. male presents with a history of asthma who states that he has had 1 day history of increasing asthma.  He denies any fever chills or sweats has had a sort of a nonproductive cough for about a week.    Past Medical History:   Diagnosis Date   • Asthma    • COPD (chronic obstructive pulmonary disease) (CMS/[facility])        Past Surgical History:   Procedure Laterality Date   • EYE SURGERY          Current Problems:   Acute respiratory distress  Acute asthma exacerbation -will reduce steroid dose today and continue inhaled bronchodilators.  Continue famotidine Zithromax and Singulair.  Acute hypoxemia -wean oxygen today          Nutrition/Diet History         Narrative     1/4: Pt sleeping at visit and did not wake to RN in room. Visually patient did not appear malnourished, though unable to complete physical exam this date.     Functional Status Independent PTA   Food Allergies NKFA     Factors Affecting   Nutritional Intake Low weight status     Anthropometrics        Current Height, Weight Height: 188 cm (74\")  Weight: 56.7 kg (125 lb) (01/04/21 0359)        Admit Height, Weight -    Flowsheet Rows      First Filed Value   Admission Height  188 cm (74\") Documented at 01/02/2021 1557   Admission Weight  63.5 kg (140 lb) Documented at 01/02/2021 1557             Ideal Body Weight (IBW)    % Ideal Body Weight        Usual Body Weight UTD   % Usual Body Weight UTD   Wt Change Observation -Wt history is variable, unsure of UBW status. Likely that 140lb records are not correct.   Weight Hx    Wt Readings from Last 30 Encounters: "   01/04/21 0359 56.7 kg (125 lb) (7 %, Z= -1.47)*   01/02/21 2206 56.4 kg (124 lb 4.8 oz) (6 %, Z= -1.52)*   01/02/21 1557 63.5 kg (140 lb) (26 %, Z= -0.65)*   12/06/20 1319 61.2 kg (135 lb) (19 %, Z= -0.90)*   11/22/20 1151 56.6 kg (124 lb 12.5 oz) (7 %, Z= -1.47)*   11/17/20 2012 56.1 kg (123 lb 10.9 oz) (6 %, Z= -1.54)*   09/26/20 1346 63.5 kg (140 lb) (27 %, Z= -0.61)*   09/23/20 1604 63.5 kg (140 lb) (27 %, Z= -0.61)*   09/23/20 1549 63.5 kg (140 lb) (27 %, Z= -0.61)*   08/26/20 1134 63.5 kg (140 lb) (27 %, Z= -0.60)*   08/06/20 1801 55 kg (121 lb 4.1 oz) (5 %, Z= -1.65)*   09/01/19 1126 60.8 kg (134 lb 0.6 oz) (24 %, Z= -0.72)*     * Growth percentiles are based on CDC (Boys, 2-20 Years) data.        BMI kg/m2 Body mass index is 16.05 kg/m².       Labs/Medications         Pertinent Labs -   Results from last 7 days   Lab Units 01/03/21  0441 01/02/21  2122   SODIUM mmol/L 140 138   POTASSIUM mmol/L 3.8 3.6   CHLORIDE mmol/L 104 104   CO2 mmol/L 23.0 24.0   BUN mg/dL 9 6   CREATININE mg/dL 0.72* 0.77   CALCIUM mg/dL 9.2 8.9   BILIRUBIN mg/dL 0.6 0.6   ALK PHOS U/L 73 71   ALT (SGPT) U/L 15 12   AST (SGOT) U/L 23 20   GLUCOSE mg/dL 140* 186*     Results from last 7 days   Lab Units 01/03/21  0441   HEMOGLOBIN g/dL 15.8   HEMATOCRIT % 46.1     COVID19   Date Value Ref Range Status   01/02/2021 Presumptive Negative Presumptive Negative - Ref. Range Final     No results found for: HGBA1C      Pertinent Medications -Pepcid, Solumedrol, Zithromax     Physical Findings        Overall Physical   Appearance, MSA Unable to complete NFPE- pt appeared thin, but did not note obvious s/s of malnutrition.   --  Edema  none     Gastrointestinal  no BM since admit, x2 days   Tubes none   Oral/Mouth Cavity No issues reported   Skin No breakdown documented   --  Current Nutrition Orders & Evaluation of Intake       Oral Nutrition     Current PO Diet Diet Regular   Supplement -   PO Evaluation     % PO Intake -100%, getting some  take out as well   --  Clinical Course    Nutrition Course Details Regular diet since admit     Nutritional Risk Screening        NRS-2002 Score   -       Nutrition Diagnosis         Nutrition Dx Problem 1 -Underweight related to likely chronic inadequate calorie intake with increased respiratory demands with asthma as evidenced by BMI 16.05      Nutrition Dx Problem 2 -       Intervention Goal         Intervention Goal(s) -Meal intake maintained at 100% of meals  -No weight loss     Nutrition Intervention        RD/Tech Action -Continue Regular diet (pt also choosing snacks between meals)     Nutrition Prescription          Diet Prescription -Regular diet   Supplement Prescription -   --  Monitor/Evaluation        Monitor PO intake, Weight, GI status     Electronically signed by:  Jane Hartman RD  01/04/21 14:15 EST

## 2021-01-04 NOTE — PLAN OF CARE
Goal Outcome Evaluation:  Plan of Care Reviewed With: patient  Progress: no change  Outcome Summary: patient had a couple of respiratory distress episodes and respiratory was called for breathing treatments.  a MDI was ordered for him to keep at bedside.  he is resting in bed without distress.  will continue to monitor.

## 2021-01-04 NOTE — PROGRESS NOTES
Discharge Planning Assessment   Daniel     Patient Name: Gregorio Winslow Jr.  MRN: [id number]  Today's Date: 1/4/2021    Admit Date: 1/2/2021    Discharge Needs Assessment     Row Name 01/04/21 1259       Living Environment    Lives With  grandparent(s)    Current Living Arrangements  home/apartment/condo    Primary Care Provided by  self    Provides Primary Care For  no one    Family Caregiver if Needed  grandparent(s)    Quality of Family Relationships  helpful    Able to Return to Prior Arrangements  yes       Resource/Environmental Concerns    Resource/Environmental Concerns  none    Transportation Concerns  car, none       Transition Planning    Patient/Family Anticipates Transition to  home with family    Patient/Family Anticipated Services at Transition      Transportation Anticipated  public transportation;other (see comments) patient states he uses [employer] for transportation normally       Discharge Needs Assessment    Readmission Within the Last 30 Days  no previous admission in last 30 days    Equipment Currently Used at Home  nebulizer    Concerns to be Addressed  denies needs/concerns at this time    Anticipated Changes Related to Illness  none    Equipment Needed After Discharge  other (see comments) watch for possible home O2        Discharge Plan     Row Name 01/04/21 1300       Plan    Plan  Anticipate routine home    Patient/Family in Agreement with Plan  yes    Plan Comments  Met with patient at bedside, reports he lives at home with grandmother. I with ADLs. Does not drive but uses ub[employer] for transportation. PCP confirmed. Denies issues with affording food or medications. Currently on 1 L NC, and does not wear home oxygen. DC barriers: IV steroids        Expected Discharge Date and Time     Expected Discharge Date Expected Discharge Time    Jan 5, 2021         Demographic Summary     Row Name 01/04/21 1259       General Information    Admission Type  observation    Arrived From  emergency  department    Referral Source  admission list    Reason for Consult  discharge planning    Preferred Language  English        Functional Status     Row Name 01/04/21 5703       Functional Status    Usual Activity Tolerance  good    Current Activity Tolerance  good       Functional Status, IADL    Medications  independent    Meal Preparation  independent    Housekeeping  independent    Laundry  independent    Shopping  independent        Met with patient in room wearing PPE: mask, goggles.     Maintained distance greater than six feet and spent less than 15 minutes in the room.            Allyssa Villafana RN

## 2021-01-04 NOTE — PROGRESS NOTES
LOS: 0 days   Patient Care Team:  Aileen Ahmadi MD as PCP - General (Family Medicine)    Subjective     Interval History:    Patient Complaints: Using albuterol inhaler or breathing treatment every 1-2 hours.  Anxious.     History taken from: patient    Review of Systems   Constitutional: Positive for fatigue. Negative for activity change, appetite change, diaphoresis and fever.   HENT: Negative for hearing loss and rhinorrhea.    Eyes: Negative for visual disturbance.   Respiratory: Positive for cough, chest tightness, shortness of breath and wheezing. Negative for stridor.    Cardiovascular: Negative for chest pain, palpitations and leg swelling.   Gastrointestinal: Negative for constipation, diarrhea, nausea and vomiting.   Genitourinary: Negative for dysuria.   Musculoskeletal: Negative for arthralgias.   Skin: Negative for rash.   Neurological: Negative for dizziness, numbness and headaches.   Psychiatric/Behavioral: Negative for confusion.           Objective     Vital Signs  Temp:  [97.5 °F (36.4 °C)-98 °F (36.7 °C)] 97.5 °F (36.4 °C)  Heart Rate:  [] 107  Resp:  [20-26] 20  BP: (111-129)/(62-75) 129/75    Physical Exam:     General Appearance:    Alert, cooperative, in no acute distress, thin   Head:    Normocephalic, without obvious abnormality, atraumatic   Eyes:            Lids and lashes normal, conjunctivae and sclerae normal, no   icterus, no pallor, corneas clear, PERRLA   Ears:    Ears appear intact with no abnormalities noted   Throat:   No oral lesions, no thrush, oral mucosa moist   Neck:   No adenopathy, supple, trachea midline, no thyromegaly, no   carotid bruit, no JVD   Lungs:    30 minutes after inhaler use lung exam is clear    Heart:    Regular rhythm and normal rate, normal S1 and S2, no            murmur, no gallop, no rub, no click   Chest Wall:    No abnormalities observed   Abdomen:     Normal bowel sounds, no masses, no organomegaly, soft        Non-tender non-distended,  no guarding,   Extremities:   Moves all extremities well, no edema, no cyanosis, no             Redness   Pulses:   Pulses palpable and equal bilaterally   Skin:   No bleeding, bruising or rash   Lymph nodes:   No palpable adenopathy   Neurologic:   Cranial nerves 2 - 12 grossly intact, sensation intact, DTR       present and equal bilaterally        Results Review:    Lab Results (last 24 hours)     ** No results found for the last 24 hours. **           Imaging Results (Last 24 Hours)     ** No results found for the last 24 hours. **               I reviewed the patient's new clinical results.    Medication Review:   Scheduled Meds:azithromycin, 250 mg, Oral, Q24H  budesonide, 0.5 mg, Nebulization, BID - RT  famotidine, 20 mg, Oral, BID AC  ipratropium-albuterol, 3 mL, Nebulization, Q6H While Awake - RT  methylPREDNISolone sodium succinate, 60 mg, Intravenous, Q8H  montelukast, 10 mg, Oral, Nightly      Continuous Infusions:   PRN Meds:.•  acetaminophen  •  albuterol  •  calcium carbonate  •  ondansetron  •  [COMPLETED] Insert peripheral IV **AND** sodium chloride  •  zolpidem     Assessment/Plan       Acute respiratory distress  Acute asthma exacerbation -will reduce steroid dose today and continue inhaled bronchodilators.  Continue famotidine Zithromax and Singulair.  Acute hypoxemia -wean oxygen today      Plan for disposition: Home soon    Jenny Williamson MD  01/04/21  08:35 EST

## 2021-01-05 VITALS
TEMPERATURE: 98.5 F | HEART RATE: 64 BPM | OXYGEN SATURATION: 98 % | WEIGHT: 125 LBS | HEIGHT: 74 IN | BODY MASS INDEX: 16.04 KG/M2 | SYSTOLIC BLOOD PRESSURE: 119 MMHG | RESPIRATION RATE: 20 BRPM | DIASTOLIC BLOOD PRESSURE: 60 MMHG

## 2021-01-05 PROBLEM — J45.51 SEVERE PERSISTENT ASTHMA WITH ACUTE EXACERBATION: Status: ACTIVE | Noted: 2021-01-05

## 2021-01-05 PROCEDURE — 94618 PULMONARY STRESS TESTING: CPT

## 2021-01-05 PROCEDURE — 94799 UNLISTED PULMONARY SVC/PX: CPT

## 2021-01-05 PROCEDURE — 96375 TX/PRO/DX INJ NEW DRUG ADDON: CPT

## 2021-01-05 PROCEDURE — 25010000002 METHYLPREDNISOLONE PER 40 MG: Performed by: INTERNAL MEDICINE

## 2021-01-05 PROCEDURE — G0378 HOSPITAL OBSERVATION PER HR: HCPCS

## 2021-01-05 PROCEDURE — 25010000002 ONDANSETRON PER 1 MG: Performed by: NURSE PRACTITIONER

## 2021-01-05 PROCEDURE — 96376 TX/PRO/DX INJ SAME DRUG ADON: CPT

## 2021-01-05 RX ORDER — ACETAMINOPHEN 325 MG/1
650 TABLET ORAL EVERY 6 HOURS PRN
Start: 2021-01-05

## 2021-01-05 RX ORDER — MONTELUKAST SODIUM 10 MG/1
10 TABLET ORAL NIGHTLY
Qty: 30 TABLET | Refills: 5 | Status: SHIPPED | OUTPATIENT
Start: 2021-01-05

## 2021-01-05 RX ORDER — AZITHROMYCIN 250 MG/1
TABLET, FILM COATED ORAL
Qty: 2 TABLET | Refills: 0 | Status: SHIPPED | OUTPATIENT
Start: 2021-01-06

## 2021-01-05 RX ORDER — FAMOTIDINE 20 MG/1
20 TABLET, FILM COATED ORAL 2 TIMES DAILY
Qty: 60 TABLET | Refills: 0 | Status: SHIPPED | OUTPATIENT
Start: 2021-01-05

## 2021-01-05 RX ORDER — PREDNISONE 10 MG/1
TABLET ORAL
Qty: 40 TABLET | Refills: 0 | Status: SHIPPED | OUTPATIENT
Start: 2021-01-05 | End: 2021-05-14

## 2021-01-05 RX ORDER — ONDANSETRON 4 MG/1
4 TABLET, FILM COATED ORAL EVERY 8 HOURS PRN
Qty: 20 TABLET | Refills: 0 | Status: SHIPPED | OUTPATIENT
Start: 2021-01-05

## 2021-01-05 RX ADMIN — Medication 10 ML: at 09:26

## 2021-01-05 RX ADMIN — AZITHROMYCIN DIHYDRATE 250 MG: 250 TABLET, FILM COATED ORAL at 09:26

## 2021-01-05 RX ADMIN — IPRATROPIUM BROMIDE AND ALBUTEROL SULFATE 3 ML: 2.5; .5 SOLUTION RESPIRATORY (INHALATION) at 10:30

## 2021-01-05 RX ADMIN — FAMOTIDINE 20 MG: 20 TABLET ORAL at 09:26

## 2021-01-05 RX ADMIN — METHYLPREDNISOLONE SODIUM SUCCINATE 40 MG: 40 INJECTION, POWDER, FOR SOLUTION INTRAMUSCULAR; INTRAVENOUS at 01:45

## 2021-01-05 RX ADMIN — ONDANSETRON 4 MG: 2 INJECTION, SOLUTION INTRAMUSCULAR; INTRAVENOUS at 09:26

## 2021-01-05 RX ADMIN — IPRATROPIUM BROMIDE AND ALBUTEROL SULFATE 3 ML: 2.5; .5 SOLUTION RESPIRATORY (INHALATION) at 08:15

## 2021-01-05 RX ADMIN — BUDESONIDE 0.5 MG: 0.5 INHALANT RESPIRATORY (INHALATION) at 08:15

## 2021-01-05 NOTE — PLAN OF CARE
Goal Outcome Evaluation:  Plan of Care Reviewed With: patient  Progress: no change  Outcome Summary: Patient in bed eyes closed resting.  1L O2.  Continues on IV steriods.  Will continue to monitor.

## 2021-01-05 NOTE — PROGRESS NOTES
Continued Stay Note  MEAGAN Sanchez     Patient Name: Gregorio Winslow Jr.  MRN: [id number]  Today's Date: 1/5/2021    Admit Date: 1/2/2021    Discharge Plan     Row Name 01/05/21 1237       Plan    Plan  Anticipate routine home    Plan Comments  Did not qualify for home oxygen on 1/5         Allyssa Villafana RN

## 2021-01-05 NOTE — PROGRESS NOTES
Continued Stay Note  MEAGAN Sanchez     Patient Name: Gregorio Winslow Jr.  MRN: [id number]  Today's Date: 1/5/2021    Admit Date: 1/2/2021    Discharge Plan     Row Name 01/05/21 1102       Plan    Plan  Anticipate routine home. Walking oximetry ordered 1/5, pending.    Plan Comments  DC barriers: 1 L NC, IV steroids            Allyssa Villafana RN

## 2021-01-05 NOTE — PROGRESS NOTES
"PULMONARY CRITICAL CARE Progress  NOTE      PATIENT IDENTIFICATION:  Name: Gregorio Winslow Jr.  MRN: [id number]  :  2000     Age: 20 y.o.  Sex: male    DATE OF Note:  2021   Referring Physician: Aileen Ahmadi MD                  Subjective:   Feeling better,   no SOB no chest pain, no nausea or vomiting, no change in bowel habit, no dysuria,  no new  skin rash or itching.      Objective:  tMax 24 hrs: Temp (24hrs), Av.7 °F (36.5 °C), Min:97.5 °F (36.4 °C), Max:98 °F (36.7 °C)      Vitals Ranges:   Temp:  [97.5 °F (36.4 °C)-98 °F (36.7 °C)] 97.5 °F (36.4 °C)  Heart Rate:  [] 98  Resp:  [18-26] 20  BP: (111-129)/(62-75) 128/74    Intake and Output Last 3 Shifts:   I/O last 3 completed shifts:  In: 1560 [P.O.:1560]  Out: -     Exam:  /74 (BP Location: Left arm, Patient Position: Lying)   Pulse 98   Temp 97.5 °F (36.4 °C) (Tympanic)   Resp 20   Ht 188 cm (74\")   Wt 56.7 kg (125 lb)   SpO2 100%   BMI 16.05 kg/m²     General Appearance:     HEENT:  Normocephalic, without obvious abnormality, Conjunctiva/corneas clear,.  Normal external ear canals, Nares normal, no drainage     Neck:  Supple, symmetrical, trachea midline. No JVD.  Lungs /Chest wall:   Bilateral basal rhonchi, respirations unlabored symmetrical wall movement.     Heart:  Regular rate and rhythm, systolic murmur PMI left sternal border  Abdomen: Soft, non-tender, no masses, no organomegaly.    Extremities: Trace edema no clubbing or Cyanosis        Medications:    Current Facility-Administered Medications:   •  acetaminophen (TYLENOL) tablet 650 mg, 650 mg, Oral, Q6H PRN, Delio Melania D, APRN  •  albuterol (PROVENTIL) nebulizer solution 0.083% 2.5 mg/3mL, 2.5 mg, Nebulization, Q2H PRN, Aileen Ahmadi MD, 2.5 mg at 21 1552  •  azithromycin (ZITHROMAX) tablet 250 mg, 250 mg, Oral, Q24H, Draw, MD Jamin, 250 mg at 21 0808  •  budesonide (PULMICORT) nebulizer solution 0.5 mg, 0.5 mg, Nebulization, BID - RT, " Jamin Gagnon MD, 0.5 mg at 01/04/21 0708  •  calcium carbonate (TUMS) chewable tablet 500 mg (200 mg elemental), 2 tablet, Oral, TID PRN, Melania Kebede APRN  •  famotidine (PEPCID) tablet 20 mg, 20 mg, Oral, BID AC, Jamin Gagnon MD, 20 mg at 01/04/21 1604  •  ipratropium-albuterol (DUO-NEB) nebulizer solution 3 mL, 3 mL, Nebulization, Q6H While Awake - RT, Kori Ponce APRN, 3 mL at 01/04/21 1130  •  methylPREDNISolone sodium succinate (SOLU-Medrol) injection 40 mg, 40 mg, Intravenous, Q12H, Jenny Williamson MD, 40 mg at 01/04/21 1338  •  montelukast (SINGULAIR) tablet 10 mg, 10 mg, Oral, Nightly, Jamin Gagnon MD, 10 mg at 01/03/21 2027  •  ondansetron (ZOFRAN) injection 4 mg, 4 mg, Intravenous, Q6H PRN, Melania Kebede APRN  •  [COMPLETED] Insert peripheral IV, , , Once **AND** sodium chloride 0.9 % flush 10 mL, 10 mL, Intravenous, PRN, Huber Rios MD, 10 mL at 01/04/21 0808  •  zolpidem (AMBIEN) tablet 5 mg, 5 mg, Oral, Nightly PRN, Melania Kebede APRN    Data Review:  All labs (24hrs): No results found for this or any previous visit (from the past 24 hour(s)).     Imaging:  XR Chest 1 View  Narrative: XR CHEST 1 VW-     Date of Exam: 1/2/2021 4:13 PM     Indication: soa.     Comparison: None available.     Technique: A single view of the chest was obtained.     FINDINGS:      The heart size and pulmonary vessels are within normal limits. The  lungs are clear bilaterally. There are no pleural effusions. Bony thorax  is unremarkable.              Impression:    1. No acute cardiopulmonary disease.        Electronically Signed By-Gibran Wright MD On:1/2/2021 5:13 PM  This report was finalized on [id number] by  Gibran Wright MD.       ASSESSMENT:    Acute respiratory distress  Asthma attack       PLAN:  Antibiotics  Bronchodilator  Inhaled corticosteroids  Electrolytes/ glycemic control  DVT and GI prophylaxis.    Total Critical care time in direct medical management (   ) minutes  Toño Mcgill,  MD. CHEN, ABS.     1/4/2021  19:04 EST

## 2021-01-05 NOTE — DISCHARGE SUMMARY
Date of Discharge:  1/5/2021    Discharge Diagnosis:   **Acute respiratory distress [R06.03]   Severe persistent asthma with acute exacerbation [J45.51]       Presenting Problem/History of Present Illness  Active Hospital Problems    Diagnosis  POA   • **Acute respiratory distress [R06.03]  Yes   • Severe persistent asthma with acute exacerbation [J45.51]  Yes      Resolved Hospital Problems   No resolved problems to display.          Hospital Course  Patient is a 20 y.o. male  With a long history of asthma but no previous hospitalizations presented with increased cough, wheezing and shortness of breath x 2 weeks.  Xray showed hyperexpansion but no acute infiltrates.  He was treated with steroids, antibiotics, bronchodilators, Singulair and supplemental oxygen.  He improved.  On the day of discharge his oxygen sats are 99% on room air and he passed a 6 minute walk.  Lungs are much improved and he is requesting to go home.      Procedures Performed         Consults:   Consults     Date and Time Order Name Status Description    1/3/2021 0514 Inpatient Pulmonology Consult Completed           Pertinent Test Results:    Lab Results (most recent)     Procedure Component Value Units Date/Time    Comprehensive Metabolic Panel [[id number]]  (Abnormal) Collected: 01/03/21 0441    Specimen: Blood Updated: 01/03/21 0544     Glucose 140 mg/dL      BUN 9 mg/dL      Comment: Result checked         Creatinine 0.72 mg/dL      Sodium 140 mmol/L      Potassium 3.8 mmol/L      Chloride 104 mmol/L      CO2 23.0 mmol/L      Calcium 9.2 mg/dL      Total Protein 7.0 g/dL      Albumin 4.80 g/dL      ALT (SGPT) 15 U/L      AST (SGOT) 23 U/L      Alkaline Phosphatase 73 U/L      Total Bilirubin 0.6 mg/dL      eGFR Non African Amer 141 mL/min/1.73      Globulin 2.2 gm/dL      A/G Ratio 2.2 g/dL      BUN/Creatinine Ratio 12.5     Anion Gap 13.0 mmol/L     Narrative:      GFR Normal >60  Chronic Kidney Disease <60  Kidney Failure <15      CBC &  Differential [[id number]]  (Abnormal) Collected: 01/03/21 0441    Specimen: Blood Updated: 01/03/21 0516    Narrative:      The following orders were created for panel order CBC & Differential.  Procedure                               Abnormality         Status                     ---------                               -----------         ------                     CBC Auto Differential[[id number]]        Abnormal            Final result                 Please view results for these tests on the individual orders.    CBC Auto Differential [[id number]]  (Abnormal) Collected: 01/03/21 0441    Specimen: Blood Updated: 01/03/21 0516     WBC 7.20 10*3/mm3      RBC 4.74 10*6/mm3      Hemoglobin 15.8 g/dL      Hematocrit 46.1 %      MCV 97.3 fL      MCH 33.3 pg      MCHC 34.3 g/dL      RDW 13.1 %      RDW-SD 44.6 fl      MPV 9.4 fL      Platelets 194 10*3/mm3      Neutrophil % 93.9 %      Lymphocyte % 5.3 %      Monocyte % 0.8 %      Eosinophil % 0.0 %      Basophil % 0.0 %      Neutrophils, Absolute 6.80 10*3/mm3      Lymphocytes, Absolute 0.40 10*3/mm3      Monocytes, Absolute 0.10 10*3/mm3      Eosinophils, Absolute 0.00 10*3/mm3      Basophils, Absolute 0.00 10*3/mm3      nRBC 0.1 /100 WBC     Blood Gas, Arterial - [[id number]]  (Abnormal) Collected: 01/03/21 0503    Specimen: Arterial Blood Updated: 01/03/21 0505     Site Left Radial     Luis's Test Positive     pH, Arterial 7.371 pH units      pCO2, Arterial 40.9 mm Hg      pO2, Arterial 106.1 mm Hg      HCO3, Arterial 23.7 mmol/L      Base Excess, Arterial -1.5 mmol/L      Comment: Serial Number: [id number]:  [id number]        O2 Saturation, Arterial 97.9 %      CO2 Content 25.0 mmol/L      Barometric Pressure for Blood Gas --     Comment: N/A        Modality Cannula     FIO2 38 %      Hemodilution No    Comprehensive Metabolic Panel [[id number]]  (Abnormal) Collected: 01/02/21 2122    Specimen: Blood Updated: 01/02/21 2146     Glucose 186 mg/dL      BUN 6 mg/dL       Creatinine 0.77 mg/dL      Sodium 138 mmol/L      Potassium 3.6 mmol/L      Chloride 104 mmol/L      CO2 24.0 mmol/L      Calcium 8.9 mg/dL      Total Protein 6.4 g/dL      Albumin 4.50 g/dL      ALT (SGPT) 12 U/L      AST (SGOT) 20 U/L      Alkaline Phosphatase 71 U/L      Total Bilirubin 0.6 mg/dL      eGFR Non African Amer 130 mL/min/1.73      Globulin 1.9 gm/dL      A/G Ratio 2.4 g/dL      BUN/Creatinine Ratio 7.8     Anion Gap 10.0 mmol/L     Narrative:      GFR Normal >60  Chronic Kidney Disease <60  Kidney Failure <15      CBC & Differential [[id number]]  (Abnormal) Collected: 01/02/21 2122    Specimen: Blood Updated: 01/02/21 2127    Narrative:      The following orders were created for panel order CBC & Differential.  Procedure                               Abnormality         Status                     ---------                               -----------         ------                     CBC Auto Differential[[id number]]        Abnormal            Final result                 Please view results for these tests on the individual orders.    CBC Auto Differential [[id number]]  (Abnormal) Collected: 01/02/21 2122    Specimen: Blood Updated: 01/02/21 2127     WBC 5.40 10*3/mm3      RBC 4.54 10*6/mm3      Hemoglobin 15.0 g/dL      Hematocrit 43.7 %      MCV 96.3 fL      MCH 33.0 pg      MCHC 34.3 g/dL      RDW 12.7 %      RDW-SD 42.9 fl      MPV 8.8 fL      Platelets 199 10*3/mm3      Neutrophil % 89.7 %      Lymphocyte % 7.4 %      Monocyte % 1.5 %      Eosinophil % 1.3 %      Basophil % 0.1 %      Neutrophils, Absolute 4.90 10*3/mm3      Lymphocytes, Absolute 0.40 10*3/mm3      Monocytes, Absolute 0.10 10*3/mm3      Eosinophils, Absolute 0.10 10*3/mm3      Basophils, Absolute 0.00 10*3/mm3      nRBC 0.0 /100 WBC     COVID-19, [facility] IN-HOUSE,NASAL Swab (NO TRANSPORT MEDIA) 2 HR TAT - Swab, Nasopharynx [[id number]]  (Normal) Collected: 01/02/21 1729    Specimen: Swab from Nasopharynx Updated: 01/02/21 1526      COVID19 Presumptive Negative    Narrative:      Fact sheet for providers: https://www.fda.gov/media/361560/download     Fact sheet for patients: https://www.fda.gov/media/515954/download    Test performed by PCR.  If inconsistent with clinical signs and symptoms patient should be tested with different authorized molecular test.                      Condition on Discharge:  stable    Vital Signs  Temp:  [97.3 °F (36.3 °C)-98.5 °F (36.9 °C)] 98.5 °F (36.9 °C)  Heart Rate:  [] 64  Resp:  [18-22] 20  BP: (119-137)/(51-74) 119/60    Physical Exam:     General Appearance:    Alert, cooperative, in no acute distress   Head:    Normocephalic, without obvious abnormality, atraumatic   Eyes:            Lids and lashes normal, conjunctivae and sclerae normal, no   icterus, no pallor, corneas clear, PERRLA   Ears:    Ears appear intact with no abnormalities noted   Throat:   No oral lesions, no thrush, oral mucosa moist   Neck:   No adenopathy, supple, trachea midline, no thyromegaly, no   carotid bruit, no JVD   Lungs:     Faint, scattered wheezes    Heart:    Regular rhythm and normal rate, normal S1 and S2, no            murmur, no gallop, no rub, no click   Chest Wall:    No abnormalities observed   Abdomen:     Normal bowel sounds, no masses, no organomegaly, soft        non-tender, non-distended, no guarding, no rebound                tenderness   Extremities:   Moves all extremities well, no edema, no cyanosis, no             redness   Pulses:   Pulses palpable and equal bilaterally   Skin:   No bleeding, bruising or rash   Lymph nodes:   No palpable adenopathy   Neurologic:   Cranial nerves 2 - 12 grossly intact, sensation intact, DTR       present and equal bilaterally       Discharge Disposition  Home or Self Care    Discharge Medications     Discharge Medications      New Medications      Instructions Start Date   acetaminophen 325 MG tablet  Commonly known as: TYLENOL   650 mg, Oral, Every 6 Hours PRN       azithromycin 250 MG tablet  Commonly known as: ZITHROMAX   Take once a day for 2 days   Start Date: January 6, 2021     famotidine 20 MG tablet  Commonly known as: PEPCID   20 mg, Oral, 2 Times Daily      montelukast 10 MG tablet  Commonly known as: SINGULAIR   10 mg, Oral, Nightly      ondansetron 4 MG tablet  Commonly known as: Zofran   4 mg, Oral, Every 8 Hours PRN      predniSONE 10 MG tablet  Commonly known as: DELTASONE   4 po q day x 4 days, 3 po q day x 4 days, 2 po q day x 4 days, 1 po q day x 4 days         Changes to Medications      Instructions Start Date   ipratropium-albuterol 0.5-2.5 mg/3 ml nebulizer  Commonly known as: DUO-NEB  What changed: Another medication with the same name was removed. Continue taking this medication, and follow the directions you see here.   3 mL, Nebulization, Every 4 Hours         Continue These Medications      Instructions Start Date   albuterol sulfate  (90 Base) MCG/ACT inhaler  Commonly known as: PROVENTIL HFA;VENTOLIN HFA;PROAIR HFA   2 puffs, Inhalation, Every 6 Hours PRN      budesonide-formoterol 160-4.5 MCG/ACT inhaler  Commonly known as: SYMBICORT   2 puffs, Inhalation, 2 Times Daily - RT             Discharge Diet:     Activity at Discharge:     Follow-up Appointments  No future appointments.  Additional Instructions for the Follow-ups that You Need to Schedule     Discharge Follow-up with PCP   As directed       Currently Documented PCP:    Aileen Ahmadi MD    PCP Phone Number:    [phone]     Follow Up Details: Call the office to schedule a follow up appointment with Dr. Ahmadi.               Test Results Pending at Discharge       Jenny Williamson MD  01/05/21  12:55 EST    Time: Discharge 25 min

## 2021-01-05 NOTE — PROGRESS NOTES
Exercise Oximetry    Patient Name:Gregorio Winslow Jr.   MRN: [id number]   Date: 01/05/21             ROOM AIR BASELINE   SpO2% 99   Heart Rate    Blood Pressure     EXERCISE ON ROOM AIR SpO2% EXERCISE ON O2 @  LPM SpO2%   1 MINUTE 99 1 MINUTE    2 MINUTES 98 2 MINUTES    3 MINUTES 98 3 MINUTES    4 MINUTES 98 4 MINUTES    5 MINUTES 98 5 MINUTES    6 MINUTES 97 6 MINUTES               Distance Walked   Distance Walked   Dyspnea (Gene Scale)   Dyspnea (Gene Scale)   Fatigue (Gene Scale)   Fatigue (Gene Scale)   SpO2% Post Exercise   SpO2% Post Exercise   HR Post Exercise   HR Post Exercise   Time to Recovery   Time to Recovery     Comments: pt did not drop below 97% on room air, complained mildly about dysnpea on ambulation    Xi Tang, CRT  1/5/2021  11:04 EST

## 2021-01-05 NOTE — SIGNIFICANT NOTE
01/05/21 1315   Discharge of Care   Discharge Mode wheel chair   Discharged Accompanied by self only   Discharge Teaching Done  Yes   Learning Method Explanation;Written Materials

## 2021-01-06 ENCOUNTER — READMISSION MANAGEMENT (OUTPATIENT)
Dept: CALL CENTER | Facility: HOSPITAL | Age: 21
End: 2021-01-06

## 2021-01-06 NOTE — PROGRESS NOTES
Case Management Discharge Note      Final Note: Home         Selected Continued Care - Discharged on 1/5/2021 Admission date: 1/2/2021 - Discharge disposition: Home or Self Care                 Final Discharge Disposition Code: 01 - home or self-care

## 2021-01-06 NOTE — OUTREACH NOTE
Prep Survey      Responses   [facility] facility [facility] discharged from?  [facility]   Is LACE score < 7 ?  No   Emergency Room discharge w/ pulse ox?  No   Eligibility  Readm Mgmt   Discharge diagnosis  Acute respiratory distress, severe persistent asthma with acute exacerbation   Does the patient have one of the following disease processes/diagnoses(primary or secondary)?  Other   Does the patient have Home health ordered?  No   Is there a DME ordered?  No   Comments regarding appointments  Needs f/u scheduled   Prep survey completed?  Yes          Tammy Turcios RN

## 2021-01-08 ENCOUNTER — READMISSION MANAGEMENT (OUTPATIENT)
Dept: CALL CENTER | Facility: HOSPITAL | Age: 21
End: 2021-01-08

## 2021-01-08 NOTE — OUTREACH NOTE
Medical Week 1 Survey      Responses   [facility] [place] patient discharged from?  [facility]   Does the patient have one of the following disease processes/diagnoses(primary or secondary)?  Other   Week 1 attempt successful?  Yes   Call start time  1512   Call end time  1515   Meds reviewed with patient/caregiver?  Yes   Is the patient having any side effects they believe may be caused by any medication additions or changes?  No   Does the patient have all medications ordered at discharge?  Yes   Is the patient taking all medications as directed (includes completed medication regime)?  Yes   Does the patient have a primary care provider?   Yes   Does the patient have an appointment with their PCP within 7 days of discharge?  No   Comments regarding PCP  PATIENT REQUESTED NUMBER FOR DR. GRAYSON'S OFFICE SO HE COULD CALL TO SCHEDULE HIS FOLLOW UP APPOINTMENT. SAME NUMBER GIVEN.    What is preventing the patient from scheduling follow up appointments within 7 days of discharge?  Haven't had time   Nursing Interventions  Educated patient on importance of making appointment, Advised patient to make appointment   Has the patient kept scheduled appointments due by today?  N/A   Has home health visited the patient within 72 hours of discharge?  N/A   Did the patient receive a copy of their discharge instructions?  Yes   Nursing interventions  Reviewed instructions with patient   What is the patient's perception of their health status since discharge?  Improving   Is the patient/caregiver able to teach back signs and symptoms related to disease process for when to call PCP?  Yes   Is the patient/caregiver able to teach back signs and symptoms related to disease process for when to call 911?  Yes   Is the patient/caregiver able to teach back the hierarchy of who to call/visit for symptoms/problems? PCP, Specialist, Home health nurse, Urgent Care, ED, 911  Yes   If the patient is a current smoker, are they able to teach back  resources for cessation?  Not a smoker   Week 1 call completed?  Yes          Danna Hinton LPN

## 2021-01-15 ENCOUNTER — READMISSION MANAGEMENT (OUTPATIENT)
Dept: CALL CENTER | Facility: HOSPITAL | Age: 21
End: 2021-01-15

## 2021-01-15 NOTE — OUTREACH NOTE
Medical Week 2 Survey      Responses   [facility] patient discharged from?  [facility]   Does the patient have one of the following disease processes/diagnoses(primary or secondary)?  Other   Week 2 attempt successful?  No   Unsuccessful attempts  Attempt 1          Danna Hinton LPN

## 2021-04-21 ENCOUNTER — HOSPITAL ENCOUNTER (EMERGENCY)
Facility: HOSPITAL | Age: 21
Discharge: HOME | End: 2021-04-21
Admitting: EMERGENCY MEDICINE

## 2021-04-21 ENCOUNTER — APPOINTMENT (OUTPATIENT)
Dept: GENERAL RADIOLOGY | Facility: HOSPITAL | Age: 21
End: 2021-04-21

## 2021-04-21 VITALS
RESPIRATION RATE: 18 BRPM | BODY MASS INDEX: 20.76 KG/M2 | SYSTOLIC BLOOD PRESSURE: 115 MMHG | HEART RATE: 55 BPM | WEIGHT: 132.28 LBS | DIASTOLIC BLOOD PRESSURE: 60 MMHG | TEMPERATURE: 98.8 F | OXYGEN SATURATION: 100 % | HEIGHT: 67 IN

## 2021-04-21 DIAGNOSIS — J45.901 EXACERBATION OF ASTHMA, UNSPECIFIED ASTHMA SEVERITY, UNSPECIFIED WHETHER PERSISTENT: Primary | ICD-10-CM

## 2021-04-21 DIAGNOSIS — R06.02 SHORTNESS OF BREATH: ICD-10-CM

## 2021-04-21 LAB
ANION GAP SERPL CALCULATED.3IONS-SCNC: 12 MMOL/L (ref 5–15)
BASOPHILS # BLD AUTO: 0 10*3/MM3 (ref 0–0.2)
BASOPHILS NFR BLD AUTO: 0.4 % (ref 0–1.5)
BUN SERPL-MCNC: 14 MG/DL (ref 6–20)
BUN/CREAT SERPL: 17.1 (ref 7–25)
CALCIUM SPEC-SCNC: 10.2 MG/DL (ref 8.6–10.5)
CHLORIDE SERPL-SCNC: 101 MMOL/L (ref 98–107)
CO2 SERPL-SCNC: 26 MMOL/L (ref 22–29)
CREAT SERPL-MCNC: 0.82 MG/DL (ref 0.76–1.27)
D DIMER PPP FEU-MCNC: <0.19 MG/L (FEU) (ref 0–0.59)
DEPRECATED RDW RBC AUTO: 42.4 FL (ref 37–54)
EOSINOPHIL # BLD AUTO: 0.4 10*3/MM3 (ref 0–0.4)
EOSINOPHIL NFR BLD AUTO: 5.8 % (ref 0.3–6.2)
ERYTHROCYTE [DISTWIDTH] IN BLOOD BY AUTOMATED COUNT: 12.4 % (ref 12.3–15.4)
GFR SERPL CREATININE-BSD FRML MDRD: 121 ML/MIN/1.73
GLUCOSE SERPL-MCNC: 112 MG/DL (ref 65–99)
HCT VFR BLD AUTO: 46.6 % (ref 37.5–51)
HGB BLD-MCNC: 16.1 G/DL (ref 13–17.7)
HOLD SPECIMEN: NORMAL
LYMPHOCYTES # BLD AUTO: 1.5 10*3/MM3 (ref 0.7–3.1)
LYMPHOCYTES NFR BLD AUTO: 24 % (ref 19.6–45.3)
MCH RBC QN AUTO: 34 PG (ref 26.6–33)
MCHC RBC AUTO-ENTMCNC: 34.5 G/DL (ref 31.5–35.7)
MCV RBC AUTO: 98.4 FL (ref 79–97)
MONOCYTES # BLD AUTO: 0.5 10*3/MM3 (ref 0.1–0.9)
MONOCYTES NFR BLD AUTO: 7.8 % (ref 5–12)
NEUTROPHILS NFR BLD AUTO: 3.9 10*3/MM3 (ref 1.7–7)
NEUTROPHILS NFR BLD AUTO: 62 % (ref 42.7–76)
NRBC BLD AUTO-RTO: 0 /100 WBC (ref 0–0.2)
PLATELET # BLD AUTO: 195 10*3/MM3 (ref 140–450)
PMV BLD AUTO: 9.7 FL (ref 6–12)
POTASSIUM SERPL-SCNC: 4.4 MMOL/L (ref 3.5–5.2)
RBC # BLD AUTO: 4.74 10*6/MM3 (ref 4.14–5.8)
SODIUM SERPL-SCNC: 139 MMOL/L (ref 136–145)
WBC # BLD AUTO: 6.3 10*3/MM3 (ref 3.4–10.8)

## 2021-04-21 PROCEDURE — 99283 EMERGENCY DEPT VISIT LOW MDM: CPT

## 2021-04-21 PROCEDURE — 71045 X-RAY EXAM CHEST 1 VIEW: CPT

## 2021-04-21 PROCEDURE — 25010000002 METHYLPREDNISOLONE PER 125 MG: Performed by: NURSE PRACTITIONER

## 2021-04-21 PROCEDURE — 93005 ELECTROCARDIOGRAM TRACING: CPT | Performed by: NURSE PRACTITIONER

## 2021-04-21 PROCEDURE — 80048 BASIC METABOLIC PNL TOTAL CA: CPT | Performed by: NURSE PRACTITIONER

## 2021-04-21 PROCEDURE — 85025 COMPLETE CBC W/AUTO DIFF WBC: CPT | Performed by: NURSE PRACTITIONER

## 2021-04-21 PROCEDURE — 94640 AIRWAY INHALATION TREATMENT: CPT

## 2021-04-21 PROCEDURE — 85379 FIBRIN DEGRADATION QUANT: CPT | Performed by: NURSE PRACTITIONER

## 2021-04-21 PROCEDURE — 96374 THER/PROPH/DIAG INJ IV PUSH: CPT

## 2021-04-21 PROCEDURE — 94799 UNLISTED PULMONARY SVC/PX: CPT

## 2021-04-21 RX ORDER — METHYLPREDNISOLONE SODIUM SUCCINATE 125 MG/2ML
125 INJECTION, POWDER, LYOPHILIZED, FOR SOLUTION INTRAMUSCULAR; INTRAVENOUS ONCE
Status: COMPLETED | OUTPATIENT
Start: 2021-04-21 | End: 2021-04-21

## 2021-04-21 RX ORDER — IPRATROPIUM BROMIDE AND ALBUTEROL SULFATE 2.5; .5 MG/3ML; MG/3ML
3 SOLUTION RESPIRATORY (INHALATION) ONCE
Status: COMPLETED | OUTPATIENT
Start: 2021-04-21 | End: 2021-04-21

## 2021-04-21 RX ORDER — SODIUM CHLORIDE 0.9 % (FLUSH) 0.9 %
10 SYRINGE (ML) INJECTION AS NEEDED
Status: DISCONTINUED | OUTPATIENT
Start: 2021-04-21 | End: 2021-04-21 | Stop reason: HOSPADM

## 2021-04-21 RX ORDER — METHYLPREDNISOLONE 4 MG/1
TABLET ORAL
Qty: 21 TABLET | Refills: 0 | Status: SHIPPED | OUTPATIENT
Start: 2021-04-21 | End: 2021-05-14

## 2021-04-21 RX ADMIN — METHYLPREDNISOLONE SODIUM SUCCINATE 125 MG: 125 INJECTION, POWDER, FOR SOLUTION INTRAMUSCULAR; INTRAVENOUS at 09:58

## 2021-04-21 RX ADMIN — IPRATROPIUM BROMIDE AND ALBUTEROL SULFATE 3 ML: 2.5; .5 SOLUTION RESPIRATORY (INHALATION) at 08:50

## 2021-04-21 NOTE — ED PROVIDER NOTES
Subjective   Patient is a 20-year-old thin white male with history of asthma who presents today with complaints of wheezing and shortness of breath.  He states symptoms started this morning just prior to arrival.  He states he has had a productive cough over the last 3 days with clear sputum.  Today he also complains of some pain in the left side of his chest that is worse with palpation.  States he did take the inhalers that he has at home with no improvement.  Denies any fever chills nasal congestion.  He denies any nausea vomiting or diarrhea.  He denies any leg pain or swelling.  He denies recent travel or prolonged immobilizations or recent surgery.  He denies ill contacts.          Review of Systems   Constitutional: Negative for chills and fever.   HENT: Negative for congestion and sore throat.    Respiratory: Positive for cough, shortness of breath and wheezing.    Cardiovascular: Positive for chest pain. Negative for leg swelling.   Gastrointestinal: Negative for abdominal pain, diarrhea, nausea and vomiting.       Past Medical History:   Diagnosis Date   • Asthma    • COPD (chronic obstructive pulmonary disease) (CMS/[facility])        Allergies   Allergen Reactions   • Adhesive Tape Hives       Past Surgical History:   Procedure Laterality Date   • EYE SURGERY         No family history on file.    Social History     Socioeconomic History   • Marital status: Single     Spouse name: Not on file   • Number of children: Not on file   • Years of education: Not on file   • Highest education level: Not on file   Tobacco Use   • Smoking status: Never Smoker   • Smokeless tobacco: Never Used   Vaping Use   • Vaping Use: Never used   Substance and Sexual Activity   • Alcohol use: Yes     Comment: drinks 1/5 every 3 days   • Drug use: Not Currently     Types: Marijuana   • Sexual activity: Defer           Objective   Physical Exam  Vital signs and triage nurse note reviewed.  Constitutional: Awake, alert; well-developed  and well-nourished. No acute distress is noted.  Appears anxious.  HEENT: Normocephalic, atraumatic; pupils are PERRL with intact EOM; oropharynx is pink and moist without exudate or erythema.  No drooling or pooling of oral secretions.  Neck: Supple, full range of motion without pain; no cervical lymphadenopathy. Normal phonation.  Cardiovascular: Regular rate and rhythm, normal S1-S2.  No murmur noted.  Pulmonary: Respiratory effort regular nonlabored, mildly tachypneic, breath sounds clear to auscultation all fields.  Abdomen: Soft, nontender, nondistended with normoactive bowel sounds; no rebound or guarding.  Musculoskeletal: Independent range of motion of all extremities with no palpable tenderness or edema.  Calves are symmetric and nontender.  Neuro: Alert oriented x3, speech is clear and appropriate, GCS 15.    Skin: Flesh tone, warm, dry, intact; no erythematous or petechial rash or lesion.      Procedures           ED Course      Labs Reviewed   BASIC METABOLIC PANEL - Abnormal; Notable for the following components:       Result Value    Glucose 112 (*)     All other components within normal limits    Narrative:     GFR Normal >60  Chronic Kidney Disease <60  Kidney Failure <15     CBC WITH AUTO DIFFERENTIAL - Abnormal; Notable for the following components:    MCV 98.4 (*)     MCH 34.0 (*)     All other components within normal limits   D-DIMER, QUANTITATIVE - Normal    Narrative:     Reference Range  --------------------------------------------------------------------     < 0.50   Negative Predictive Value  0.50-0.59   Indeterminate    >= 0.60   Probable VTE             A very low percentage of patients with DVT may yield D-Dimer results   below the cut-off of 0.50 mg/L FEU.  This is known to be more   prevalent in patients with distal DVT.             Results of this test should always be interpreted in conjunction with   the patient's medical history, clinical presentation and other   findings.   Clinical diagnosis should not be based on the result of   INNOVANCE D-Dimer alone.   CBC AND DIFFERENTIAL    Narrative:     The following orders were created for panel order CBC & Differential.  Procedure                               Abnormality         Status                     ---------                               -----------         ------                     CBC Auto Differential[[id number]]        Abnormal            Final result                 Please view results for these tests on the individual orders.   EXTRA TUBES    Narrative:     The following orders were created for panel order Extra Tubes.  Procedure                               Abnormality         Status                     ---------                               -----------         ------                     Gold Top - SST[[id number]]                                   Final result                 Please view results for these tests on the individual orders.   GOLD TOP - SST     XR Chest 1 View    Result Date: 4/21/2021  1.No acute pulmonary process.  Electronically Signed By-Rory Khalil MD On:4/21/2021 9:31 AM This report was finalized on [id number] by  Rory Khalil MD.    Medications   sodium chloride 0.9 % flush 10 mL (has no administration in time range)   methylPREDNISolone sodium succinate (SOLU-Medrol) injection 125 mg (125 mg Intravenous Given 4/21/21 0958)   ipratropium-albuterol (DUO-NEB) nebulizer solution 3 mL (3 mL Nebulization Given 4/21/21 6500)                                          MDM  Number of Diagnoses or Management Options  Exacerbation of asthma, unspecified asthma severity, unspecified whether persistent  Shortness of breath  Diagnosis management comments: Comorbidities: Asthma  Differentials: Asthma exacerbation, pneumonia, pneumothorax, anxiety;this list is not all inclusive and does not constitute the entirety of considered causes  Discussion with provider:  Radiology interpretation: X-rays reviewed by me and  interpreted by radiologist: As above  Lab interpretation: Labs viewed by me significant for: As above    Patient was placed on continuous cardiac monitor.  He had IV established.  He had labs, EKG and chest x-ray obtained.  He was given 1 DuoNeb treatment on arrival.  He was given a dose of Solu-Medrol IV.  It is reported that on arrival patient did have wheezing and was tachypneic with good O2 saturation on room air.  His DuoNeb treatment was ordered and by the time I evaluated the patient his wheezing had resolved.  He was still mildly tachypneic.  He has O2 saturation 100% on room air.  He does appear anxious.    Work-up: CBC and metabolic panel are grossly unremarkable.  D-dimer is negative.  Chest x-ray shows no acute abnormality.  EKG reviewed by me interpreted by Dr. Hampton shows sinus rhythm with a ventricular rate of 62, ST elevation probable normal early repole pattern with no significant change from prior EKG on 11/17/2020.    On reexamination, the patient is resting comfortably.  He is in no acute distress.  His respirations are even and unlabored.  His breath sounds are clear and equal bilaterally.  He has stable vital signs.  He is in no respiratory distress.  He states he is feeling much better.  His work-up today is consistent with an acute asthma exacerbation.  In the believe patient's anxiety may be playing a role in some of the symptoms as well.    Diagnosis and treatment plan discussed with patient.  Patient agreeable to plan.   I discussed findings with patient who voices understanding of discharge instructions, signs and symptoms requiring return to ED; discharged improved and in stable condition with follow up for re-evaluation.  Prescription for Medrol Dosepak.         Amount and/or Complexity of Data Reviewed  Clinical lab tests: reviewed and ordered  Tests in the radiology section of CPT®: reviewed and ordered  Tests in the medicine section of CPT®: reviewed    Patient Progress  Patient  progress: stable      Final diagnoses:   Exacerbation of asthma, unspecified asthma severity, unspecified whether persistent   Shortness of breath       ED Disposition  ED Disposition     ED Disposition Condition Comment    Discharge Stable           Aileen Ahmadi MD  [address]  [place] [address] [phone]    Schedule an appointment as soon as possible for a visit            Medication List      New Prescriptions    methylPREDNISolone 4 MG dose pack  Commonly known as: MEDROL  Take as directed on package instructions.           Where to Get Your Medications      These medications were sent to [facility] - [place], [place] - [address] AT [facility] - [phone]  - [phone] FX  [address] [place] [address]    Phone: [phone]   · methylPREDNISolone 4 MG dose pack          Amita Quiroga, PATRICIA  04/21/21 1015

## 2021-04-21 NOTE — DISCHARGE INSTRUCTIONS
Continue current home medications.  Take the steroid as prescribed.  Drink plenty of fluids.  Use Mucinex for congestion.  Close follow-up with your primary care provider.  Return for new or worsening symptoms.

## 2021-04-22 LAB — QT INTERVAL: 405 MS

## 2021-05-14 ENCOUNTER — HOSPITAL ENCOUNTER (EMERGENCY)
Facility: HOSPITAL | Age: 21
Discharge: HOME | End: 2021-05-14
Admitting: EMERGENCY MEDICINE

## 2021-05-14 VITALS
TEMPERATURE: 98 F | HEART RATE: 62 BPM | HEIGHT: 74 IN | OXYGEN SATURATION: 98 % | DIASTOLIC BLOOD PRESSURE: 68 MMHG | WEIGHT: 132.5 LBS | RESPIRATION RATE: 18 BRPM | SYSTOLIC BLOOD PRESSURE: 112 MMHG | BODY MASS INDEX: 17 KG/M2

## 2021-05-14 DIAGNOSIS — T78.40XA ALLERGIC REACTION, INITIAL ENCOUNTER: Primary | ICD-10-CM

## 2021-05-14 PROCEDURE — 25010000002 DIPHENHYDRAMINE PER 50 MG: Performed by: NURSE PRACTITIONER

## 2021-05-14 PROCEDURE — 96375 TX/PRO/DX INJ NEW DRUG ADDON: CPT

## 2021-05-14 PROCEDURE — 25010000002 METHYLPREDNISOLONE PER 125 MG: Performed by: NURSE PRACTITIONER

## 2021-05-14 PROCEDURE — 99282 EMERGENCY DEPT VISIT SF MDM: CPT

## 2021-05-14 PROCEDURE — 96374 THER/PROPH/DIAG INJ IV PUSH: CPT

## 2021-05-14 RX ORDER — DIPHENHYDRAMINE HYDROCHLORIDE 50 MG/ML
25 INJECTION INTRAMUSCULAR; INTRAVENOUS ONCE
Status: COMPLETED | OUTPATIENT
Start: 2021-05-14 | End: 2021-05-14

## 2021-05-14 RX ORDER — SODIUM CHLORIDE 0.9 % (FLUSH) 0.9 %
10 SYRINGE (ML) INJECTION AS NEEDED
Status: DISCONTINUED | OUTPATIENT
Start: 2021-05-14 | End: 2021-05-14 | Stop reason: HOSPADM

## 2021-05-14 RX ORDER — METHYLPREDNISOLONE SODIUM SUCCINATE 125 MG/2ML
125 INJECTION, POWDER, LYOPHILIZED, FOR SOLUTION INTRAMUSCULAR; INTRAVENOUS ONCE
Status: COMPLETED | OUTPATIENT
Start: 2021-05-14 | End: 2021-05-14

## 2021-05-14 RX ORDER — METHYLPREDNISOLONE 4 MG/1
TABLET ORAL
Qty: 21 TABLET | Refills: 0 | OUTPATIENT
Start: 2021-05-14 | End: 2023-03-23

## 2021-05-14 RX ADMIN — FAMOTIDINE 20 MG: 10 INJECTION INTRAVENOUS at 07:43

## 2021-05-14 RX ADMIN — DIPHENHYDRAMINE HYDROCHLORIDE 25 MG: 50 INJECTION, SOLUTION INTRAMUSCULAR; INTRAVENOUS at 07:42

## 2021-05-14 RX ADMIN — METHYLPREDNISOLONE SODIUM SUCCINATE 125 MG: 125 INJECTION, POWDER, FOR SOLUTION INTRAMUSCULAR; INTRAVENOUS at 07:43

## 2021-05-15 ENCOUNTER — HOSPITAL ENCOUNTER (EMERGENCY)
Facility: HOSPITAL | Age: 21
Discharge: HOME | End: 2021-05-15
Admitting: EMERGENCY MEDICINE

## 2021-05-15 VITALS
TEMPERATURE: 98.4 F | SYSTOLIC BLOOD PRESSURE: 118 MMHG | RESPIRATION RATE: 14 BRPM | OXYGEN SATURATION: 100 % | HEART RATE: 73 BPM | WEIGHT: 137.35 LBS | DIASTOLIC BLOOD PRESSURE: 55 MMHG | BODY MASS INDEX: 17.63 KG/M2 | HEIGHT: 74 IN

## 2021-05-15 DIAGNOSIS — L50.9 HIVES: Primary | ICD-10-CM

## 2021-05-15 PROCEDURE — 99283 EMERGENCY DEPT VISIT LOW MDM: CPT

## 2021-05-15 PROCEDURE — 25010000002 DIPHENHYDRAMINE PER 50 MG: Performed by: NURSE PRACTITIONER

## 2021-05-15 PROCEDURE — 25010000002 METHYLPREDNISOLONE PER 125 MG: Performed by: NURSE PRACTITIONER

## 2021-05-15 PROCEDURE — 96374 THER/PROPH/DIAG INJ IV PUSH: CPT

## 2021-05-15 PROCEDURE — 96375 TX/PRO/DX INJ NEW DRUG ADDON: CPT

## 2021-05-15 RX ORDER — SODIUM CHLORIDE 0.9 % (FLUSH) 0.9 %
10 SYRINGE (ML) INJECTION AS NEEDED
Status: DISCONTINUED | OUTPATIENT
Start: 2021-05-15 | End: 2021-05-15 | Stop reason: HOSPADM

## 2021-05-15 RX ORDER — METHYLPREDNISOLONE SODIUM SUCCINATE 125 MG/2ML
125 INJECTION, POWDER, LYOPHILIZED, FOR SOLUTION INTRAMUSCULAR; INTRAVENOUS ONCE
Status: COMPLETED | OUTPATIENT
Start: 2021-05-15 | End: 2021-05-15

## 2021-05-15 RX ORDER — DIPHENHYDRAMINE HYDROCHLORIDE 50 MG/ML
25 INJECTION INTRAMUSCULAR; INTRAVENOUS ONCE
Status: COMPLETED | OUTPATIENT
Start: 2021-05-15 | End: 2021-05-15

## 2021-05-15 RX ADMIN — METHYLPREDNISOLONE SODIUM SUCCINATE 125 MG: 125 INJECTION, POWDER, FOR SOLUTION INTRAMUSCULAR; INTRAVENOUS at 10:21

## 2021-05-15 RX ADMIN — DIPHENHYDRAMINE HYDROCHLORIDE 25 MG: 50 INJECTION, SOLUTION INTRAMUSCULAR; INTRAVENOUS at 10:22

## 2021-05-15 RX ADMIN — FAMOTIDINE 20 MG: 10 INJECTION INTRAVENOUS at 10:22

## 2021-05-15 NOTE — DISCHARGE INSTRUCTIONS
The prednisone steroid you were prescribed yesterday    Use Benadryl every 8 hours    See a dermatologist or primary care on Monday for any worsening symptoms or return to the ER if worse

## 2021-05-15 NOTE — ED PROVIDER NOTES
Subjective   Patient is a 21-year-old gentleman who was seen here yesterday for hives who comes back in today for hives as well.  He states that his hives almost completely resolved yesterday but he did not get his prescriptions filled and they returned today.-He states that he does not know what his trigger is.  He states he felt like his throat was swelling earlier this morning but he is not having any difficulty breathing now--he denies pain.  Denies knowing what his trigger is.  He states that it is pruritic.          Review of Systems   Constitutional: Negative for chills, fatigue and fever.   HENT: Negative for congestion, tinnitus and trouble swallowing.    Eyes: Negative for photophobia, discharge and redness.   Respiratory: Negative for cough and shortness of breath.    Cardiovascular: Negative for chest pain and palpitations.   Gastrointestinal: Negative for abdominal pain, diarrhea, nausea and vomiting.   Genitourinary: Negative for dysuria, frequency and urgency.   Musculoskeletal: Negative for back pain, joint swelling and myalgias.   Skin: Positive for rash.   Neurological: Negative for dizziness and headaches.   Psychiatric/Behavioral: Negative for confusion.   All other systems reviewed and are negative.      Past Medical History:   Diagnosis Date   • Asthma    • COPD (chronic obstructive pulmonary disease) (CMS/[facility])        Allergies   Allergen Reactions   • Adhesive Tape Hives       Past Surgical History:   Procedure Laterality Date   • EYE SURGERY         History reviewed. No pertinent family history.    Social History     Socioeconomic History   • Marital status: Single     Spouse name: Not on file   • Number of children: Not on file   • Years of education: Not on file   • Highest education level: Not on file   Tobacco Use   • Smoking status: Never Smoker   • Smokeless tobacco: Never Used   Vaping Use   • Vaping Use: Never used   Substance and Sexual Activity   • Alcohol use: Yes     Comment:  drinks 1/5 every 3 days   • Drug use: Not Currently     Types: Marijuana   • Sexual activity: Defer           Objective   Physical Exam  Vitals reviewed.   Constitutional:       Appearance: Normal appearance. He is well-developed.   HENT:      Head: Normocephalic and atraumatic.      Right Ear: External ear normal.      Left Ear: External ear normal.      Nose: Nose normal.      Mouth/Throat:      Lips: Pink.      Mouth: Mucous membranes are moist. No oral lesions or angioedema.      Tongue: No lesions. Tongue does not deviate from midline.      Palate: No mass and lesions.      Pharynx: Oropharynx is clear. Uvula midline. No pharyngeal swelling, oropharyngeal exudate, posterior oropharyngeal erythema or uvula swelling.      Tonsils: No tonsillar exudate or tonsillar abscesses.   Eyes:      Conjunctiva/sclera: Conjunctivae normal.      Pupils: Pupils are equal, round, and reactive to light.   Cardiovascular:      Rate and Rhythm: Normal rate and regular rhythm.      Heart sounds: Normal heart sounds.   Pulmonary:      Effort: Pulmonary effort is normal.      Breath sounds: Normal breath sounds.   Abdominal:      General: Abdomen is flat. Bowel sounds are normal.      Palpations: Abdomen is soft.   Musculoskeletal:         General: Normal range of motion.      Cervical back: Normal range of motion and neck supple.   Skin:     General: Skin is warm and dry.      Capillary Refill: Capillary refill takes less than 2 seconds.      Findings: Erythema and rash present. Rash is urticarial.      Nails: There is no clubbing.   Neurological:      General: No focal deficit present.      Mental Status: He is alert and oriented to person, place, and time.      GCS: GCS eye subscore is 4. GCS verbal subscore is 5. GCS motor subscore is 6.   Psychiatric:         Mood and Affect: Mood normal.         Behavior: Behavior normal.         Thought Content: Thought content normal.         Judgment: Judgment normal.         Procedures            ED Course      .vs  Labs Reviewed - No data to display  Medications   sodium chloride 0.9 % flush 10 mL (has no administration in time range)   sodium chloride 0.9 % flush 10 mL (has no administration in time range)   diphenhydrAMINE (BENADRYL) injection 25 mg (25 mg Intravenous Given 5/15/21 1022)   famotidine (PEPCID) injection 20 mg (20 mg Intravenous Given 5/15/21 1022)   methylPREDNISolone sodium succinate (SOLU-Medrol) injection 125 mg (125 mg Intravenous Given 5/15/21 1021)     No radiology results for the last day                                       MDM  Number of Diagnoses or Management Options  Diagnosis management comments: Patient had IV established and blood work was obtained.  Patient was given Solu-Medrol Benadryl and Pepcid.-    Chart review was performed from yesterday's visit and I spoke with the patient at length about not getting his prescriptions filled.    On reevaluation the patient's hives were subsiding but not completely gone the patient is alert oriented nontoxic he is not tripoding he is controlling his secretions he has no difficulty breathing or swallowing he was discharged home to get his Medrol Dosepak filled that he was given yesterday he was advised to use Benadryl every 8 hours and to follow-up with dermatology on Monday if not improving he verbalized understood discharge instructions    Risk of Complications, Morbidity, and/or Mortality  Presenting problems: minimal  Diagnostic procedures: minimal  Management options: minimal    Patient Progress  Patient progress: improved      Final diagnoses:   None       ED Disposition  ED Disposition     None          No follow-up provider specified.       Medication List      No changes were made to your prescriptions during this visit.

## 2021-05-15 NOTE — ED PROVIDER NOTES
Subjective   Patient is a 21-year-old gentleman who was seen here yesterday for hives who comes back in today for hives as well.  He states that his hives almost completely resolved yesterday but he did not get his prescriptions filled and they returned today.-He states that he does not know what his trigger is.  He states he felt like his throat was swelling earlier this morning but he is not having any difficulty breathing now--he denies pain.  Denies knowing what his trigger is.  He states that it is pruritic.          Review of Systems   Constitutional: Negative for chills, fatigue and fever.   HENT: Negative for congestion, tinnitus and trouble swallowing.    Eyes: Negative for photophobia, discharge and redness.   Respiratory: Negative for cough and shortness of breath.    Cardiovascular: Negative for chest pain and palpitations.   Gastrointestinal: Negative for abdominal pain, diarrhea, nausea and vomiting.   Genitourinary: Negative for dysuria, frequency and urgency.   Musculoskeletal: Negative for back pain, joint swelling and myalgias.   Skin: Positive for rash.   Neurological: Negative for dizziness and headaches.   Psychiatric/Behavioral: Negative for confusion.   All other systems reviewed and are negative.      Past Medical History:   Diagnosis Date   • Asthma    • COPD (chronic obstructive pulmonary disease) (CMS/[facility])        Allergies   Allergen Reactions   • Adhesive Tape Hives       Past Surgical History:   Procedure Laterality Date   • EYE SURGERY         History reviewed. No pertinent family history.    Social History     Socioeconomic History   • Marital status: Single     Spouse name: Not on file   • Number of children: Not on file   • Years of education: Not on file   • Highest education level: Not on file   Tobacco Use   • Smoking status: Never Smoker   • Smokeless tobacco: Never Used   Vaping Use   • Vaping Use: Never used   Substance and Sexual Activity   • Alcohol use: Yes     Comment:  drinks 1/5 every 3 days   • Drug use: Not Currently     Types: Marijuana   • Sexual activity: Defer           Objective   Physical Exam  Vitals reviewed.   Constitutional:       Appearance: Normal appearance. He is well-developed.   HENT:      Head: Normocephalic and atraumatic.      Right Ear: External ear normal.      Left Ear: External ear normal.      Nose: Nose normal.      Mouth/Throat:      Lips: Pink.      Mouth: Mucous membranes are moist. No oral lesions or angioedema.      Tongue: No lesions. Tongue does not deviate from midline.      Palate: No mass and lesions.      Pharynx: Oropharynx is clear. Uvula midline. No pharyngeal swelling, oropharyngeal exudate, posterior oropharyngeal erythema or uvula swelling.      Tonsils: No tonsillar exudate or tonsillar abscesses.   Eyes:      Conjunctiva/sclera: Conjunctivae normal.      Pupils: Pupils are equal, round, and reactive to light.   Cardiovascular:      Rate and Rhythm: Normal rate and regular rhythm.      Heart sounds: Normal heart sounds.   Pulmonary:      Effort: Pulmonary effort is normal.      Breath sounds: Normal breath sounds.   Abdominal:      General: Abdomen is flat. Bowel sounds are normal.      Palpations: Abdomen is soft.   Musculoskeletal:         General: Normal range of motion.      Cervical back: Normal range of motion and neck supple.   Skin:     General: Skin is warm and dry.      Capillary Refill: Capillary refill takes less than 2 seconds.      Findings: Erythema and rash present. Rash is urticarial.      Nails: There is no clubbing.   Neurological:      General: No focal deficit present.      Mental Status: He is alert and oriented to person, place, and time.      GCS: GCS eye subscore is 4. GCS verbal subscore is 5. GCS motor subscore is 6.   Psychiatric:         Mood and Affect: Mood normal.         Behavior: Behavior normal.         Thought Content: Thought content normal.         Judgment: Judgment normal.         Procedures      "      ED Course      /57   Pulse 66   Temp 97.4 °F (36.3 °C) (Oral)   Resp 16   Ht 188 cm (74\")   Wt 62.3 kg (137 lb 5.6 oz)   SpO2 99%   BMI 17.63 kg/m²   Labs Reviewed - No data to display  Medications   sodium chloride 0.9 % flush 10 mL (has no administration in time range)   sodium chloride 0.9 % flush 10 mL (has no administration in time range)   diphenhydrAMINE (BENADRYL) injection 25 mg (25 mg Intravenous Given 5/15/21 1022)   famotidine (PEPCID) injection 20 mg (20 mg Intravenous Given 5/15/21 1022)   methylPREDNISolone sodium succinate (SOLU-Medrol) injection 125 mg (125 mg Intravenous Given 5/15/21 1021)     No radiology results for the last day                                       MDM  Number of Diagnoses or Management Options  Diagnosis management comments: Patient had IV established and blood work was obtained.  Patient was given Solu-Medrol Benadryl and Pepcid.-    Chart review was performed from yesterday's visit and I spoke with the patient at length about not getting his prescriptions filled.    Risk of Complications, Morbidity, and/or Mortality  Presenting problems: minimal  Diagnostic procedures: minimal  Management options: minimal    Patient Progress  Patient progress: improved      Final diagnoses:   Hives       ED Disposition  ED Disposition     ED Disposition Condition Comment    Discharge Stable           Aileen Ahmadi MD  [address]  [place] [address]  [phone]    In 3 days  If symptoms worsen, As needed    Banet, Duane Edward, MD  [address] [facility]   [address]  [place] [address]  [phone]    In 3 days  If symptoms worsen, As needed         Medication List      No changes were made to your prescriptions during this visit.          Tatiana Wood, APRN  05/15/21 1049    "

## 2021-05-15 NOTE — ED NOTES
Patient states that yesterday and today he took Singular and 30-40 minutes later he started having a rash.  Yesterday the rash was just on his hands but today the rash is all over his body and his throat felt tight and swollen, his throat symptoms have since eased. Patient does felt short of breath and weak, he is not able to do what he normally does.  Rash is very itchy, very red, warm to the touch and it started as whelps but it is now slightly raised in patches all over his body.  Came to the ED yesterday and was told to come back if it came back.         Bindu Cruz, RN  05/15/21 9597

## 2021-05-15 NOTE — ED NOTES
Patients rash is subsiding and the whelps are not present any longer.  Patients skin is very red but he is still breathing normally.       Bindu Cruz RN  05/15/21 1998

## 2022-06-24 ENCOUNTER — APPOINTMENT (OUTPATIENT)
Dept: CT IMAGING | Facility: HOSPITAL | Age: 22
End: 2022-06-24

## 2022-06-24 ENCOUNTER — APPOINTMENT (OUTPATIENT)
Dept: GENERAL RADIOLOGY | Facility: HOSPITAL | Age: 22
End: 2022-06-24

## 2022-06-24 ENCOUNTER — HOSPITAL ENCOUNTER (EMERGENCY)
Facility: HOSPITAL | Age: 22
Discharge: HOME | End: 2022-06-24
Attending: EMERGENCY MEDICINE | Admitting: EMERGENCY MEDICINE

## 2022-06-24 VITALS
SYSTOLIC BLOOD PRESSURE: 118 MMHG | OXYGEN SATURATION: 97 % | BODY MASS INDEX: 16.52 KG/M2 | WEIGHT: 128.75 LBS | HEART RATE: 78 BPM | HEIGHT: 74 IN | TEMPERATURE: 98.5 F | DIASTOLIC BLOOD PRESSURE: 71 MMHG | RESPIRATION RATE: 14 BRPM

## 2022-06-24 DIAGNOSIS — J18.9 COMMUNITY ACQUIRED PNEUMONIA OF LEFT LOWER LOBE OF LUNG: Primary | ICD-10-CM

## 2022-06-24 DIAGNOSIS — J45.901 MODERATE ACUTE EXACERBATION OF ASTHMA: ICD-10-CM

## 2022-06-24 LAB
ALBUMIN SERPL-MCNC: 4 G/DL (ref 3.5–5.2)
ALBUMIN/GLOB SERPL: 1.7 G/DL
ALP SERPL-CCNC: 61 U/L (ref 39–117)
ALT SERPL W P-5'-P-CCNC: 16 U/L (ref 1–41)
ANION GAP SERPL CALCULATED.3IONS-SCNC: 11 MMOL/L (ref 5–15)
APTT PPP: 31.3 SECONDS (ref 61–76.5)
AST SERPL-CCNC: 63 U/L (ref 1–40)
BASOPHILS # BLD AUTO: 0 10*3/MM3 (ref 0–0.2)
BASOPHILS NFR BLD AUTO: 0.7 % (ref 0–1.5)
BILIRUB SERPL-MCNC: 0.9 MG/DL (ref 0–1.2)
BUN SERPL-MCNC: 13 MG/DL (ref 6–20)
BUN/CREAT SERPL: 17.3 (ref 7–25)
CALCIUM SPEC-SCNC: 8.3 MG/DL (ref 8.6–10.5)
CHLORIDE SERPL-SCNC: 98 MMOL/L (ref 98–107)
CO2 SERPL-SCNC: 25 MMOL/L (ref 22–29)
CREAT SERPL-MCNC: 0.75 MG/DL (ref 0.76–1.27)
DEPRECATED RDW RBC AUTO: 42 FL (ref 37–54)
EGFRCR SERPLBLD CKD-EPI 2021: 131.7 ML/MIN/1.73
EOSINOPHIL # BLD AUTO: 0.1 10*3/MM3 (ref 0–0.4)
EOSINOPHIL NFR BLD AUTO: 1.1 % (ref 0.3–6.2)
ERYTHROCYTE [DISTWIDTH] IN BLOOD BY AUTOMATED COUNT: 12.5 % (ref 12.3–15.4)
GLOBULIN UR ELPH-MCNC: 2.4 GM/DL
GLUCOSE SERPL-MCNC: 78 MG/DL (ref 65–99)
HCT VFR BLD AUTO: 41.1 % (ref 37.5–51)
HGB BLD-MCNC: 14.2 G/DL (ref 13–17.7)
INR PPP: 1.09 (ref 0.93–1.1)
LYMPHOCYTES # BLD AUTO: 0.9 10*3/MM3 (ref 0.7–3.1)
LYMPHOCYTES NFR BLD AUTO: 17.3 % (ref 19.6–45.3)
MCH RBC QN AUTO: 32.5 PG (ref 26.6–33)
MCHC RBC AUTO-ENTMCNC: 34.4 G/DL (ref 31.5–35.7)
MCV RBC AUTO: 94.4 FL (ref 79–97)
MONOCYTES # BLD AUTO: 0.6 10*3/MM3 (ref 0.1–0.9)
MONOCYTES NFR BLD AUTO: 11.5 % (ref 5–12)
NEUTROPHILS NFR BLD AUTO: 3.6 10*3/MM3 (ref 1.7–7)
NEUTROPHILS NFR BLD AUTO: 69.4 % (ref 42.7–76)
NRBC BLD AUTO-RTO: 0.2 /100 WBC (ref 0–0.2)
NT-PROBNP SERPL-MCNC: 142.4 PG/ML (ref 0–450)
PLATELET # BLD AUTO: 148 10*3/MM3 (ref 140–450)
PMV BLD AUTO: 8.9 FL (ref 6–12)
POTASSIUM SERPL-SCNC: 3.7 MMOL/L (ref 3.5–5.2)
PROT SERPL-MCNC: 6.4 G/DL (ref 6–8.5)
PROTHROMBIN TIME: 11.2 SECONDS (ref 9.6–11.7)
RBC # BLD AUTO: 4.36 10*6/MM3 (ref 4.14–5.8)
SARS-COV-2 RNA PNL SPEC NAA+PROBE: NOT DETECTED
SODIUM SERPL-SCNC: 134 MMOL/L (ref 136–145)
TROPONIN T SERPL-MCNC: <0.01 NG/ML (ref 0–0.03)
WBC NRBC COR # BLD: 5.1 10*3/MM3 (ref 3.4–10.8)

## 2022-06-24 PROCEDURE — 99284 EMERGENCY DEPT VISIT MOD MDM: CPT

## 2022-06-24 PROCEDURE — 94799 UNLISTED PULMONARY SVC/PX: CPT

## 2022-06-24 PROCEDURE — 85025 COMPLETE CBC W/AUTO DIFF WBC: CPT | Performed by: EMERGENCY MEDICINE

## 2022-06-24 PROCEDURE — 99283 EMERGENCY DEPT VISIT LOW MDM: CPT

## 2022-06-24 PROCEDURE — 25010000002 METHYLPREDNISOLONE PER 125 MG: Performed by: EMERGENCY MEDICINE

## 2022-06-24 PROCEDURE — 94640 AIRWAY INHALATION TREATMENT: CPT

## 2022-06-24 PROCEDURE — 87635 SARS-COV-2 COVID-19 AMP PRB: CPT | Performed by: EMERGENCY MEDICINE

## 2022-06-24 PROCEDURE — 85730 THROMBOPLASTIN TIME PARTIAL: CPT | Performed by: EMERGENCY MEDICINE

## 2022-06-24 PROCEDURE — 83880 ASSAY OF NATRIURETIC PEPTIDE: CPT | Performed by: EMERGENCY MEDICINE

## 2022-06-24 PROCEDURE — 71275 CT ANGIOGRAPHY CHEST: CPT

## 2022-06-24 PROCEDURE — 25010000002 CEFTRIAXONE PER 250 MG: Performed by: EMERGENCY MEDICINE

## 2022-06-24 PROCEDURE — 80053 COMPREHEN METABOLIC PANEL: CPT | Performed by: EMERGENCY MEDICINE

## 2022-06-24 PROCEDURE — 71045 X-RAY EXAM CHEST 1 VIEW: CPT

## 2022-06-24 PROCEDURE — 96365 THER/PROPH/DIAG IV INF INIT: CPT

## 2022-06-24 PROCEDURE — 85610 PROTHROMBIN TIME: CPT | Performed by: EMERGENCY MEDICINE

## 2022-06-24 PROCEDURE — 96372 THER/PROPH/DIAG INJ SC/IM: CPT

## 2022-06-24 PROCEDURE — 84484 ASSAY OF TROPONIN QUANT: CPT | Performed by: EMERGENCY MEDICINE

## 2022-06-24 PROCEDURE — 0 IOPAMIDOL PER 1 ML: Performed by: EMERGENCY MEDICINE

## 2022-06-24 RX ORDER — PREDNISONE 20 MG/1
40 TABLET ORAL DAILY
Qty: 10 TABLET | Refills: 0 | OUTPATIENT
Start: 2022-06-24 | End: 2023-03-23

## 2022-06-24 RX ORDER — ALBUTEROL SULFATE 90 UG/1
6 AEROSOL, METERED RESPIRATORY (INHALATION) ONCE
Status: COMPLETED | OUTPATIENT
Start: 2022-06-24 | End: 2022-06-24

## 2022-06-24 RX ORDER — IPRATROPIUM BROMIDE AND ALBUTEROL SULFATE 2.5; .5 MG/3ML; MG/3ML
3 SOLUTION RESPIRATORY (INHALATION) ONCE
Status: COMPLETED | OUTPATIENT
Start: 2022-06-24 | End: 2022-06-24

## 2022-06-24 RX ORDER — ALBUTEROL SULFATE 90 UG/1
2 AEROSOL, METERED RESPIRATORY (INHALATION) EVERY 4 HOURS PRN
Qty: 8 G | Refills: 3 | Status: SHIPPED | OUTPATIENT
Start: 2022-06-24

## 2022-06-24 RX ORDER — METHYLPREDNISOLONE SODIUM SUCCINATE 125 MG/2ML
80 INJECTION, POWDER, LYOPHILIZED, FOR SOLUTION INTRAMUSCULAR; INTRAVENOUS ONCE
Status: COMPLETED | OUTPATIENT
Start: 2022-06-24 | End: 2022-06-24

## 2022-06-24 RX ORDER — DOXYCYCLINE 100 MG/1
100 CAPSULE ORAL 2 TIMES DAILY
Qty: 14 CAPSULE | Refills: 0 | Status: SHIPPED | OUTPATIENT
Start: 2022-06-24

## 2022-06-24 RX ADMIN — CEFTRIAXONE 1 G: 1 INJECTION, POWDER, FOR SOLUTION INTRAMUSCULAR; INTRAVENOUS at 15:03

## 2022-06-24 RX ADMIN — METHYLPREDNISOLONE SODIUM SUCCINATE 80 MG: 125 INJECTION, POWDER, FOR SOLUTION INTRAMUSCULAR; INTRAVENOUS at 12:43

## 2022-06-24 RX ADMIN — SODIUM CHLORIDE 250 ML: 9 INJECTION, SOLUTION INTRAVENOUS at 12:59

## 2022-06-24 RX ADMIN — ALBUTEROL SULFATE 6 PUFF: 108 INHALANT RESPIRATORY (INHALATION) at 13:03

## 2022-06-24 RX ADMIN — IOPAMIDOL 100 ML: 755 INJECTION, SOLUTION INTRAVENOUS at 13:27

## 2022-06-24 RX ADMIN — IPRATROPIUM BROMIDE AND ALBUTEROL SULFATE 3 ML: .5; 3 SOLUTION RESPIRATORY (INHALATION) at 14:00

## 2022-06-24 NOTE — ED PROVIDER NOTES
Subjective   22-year-old male complaining of 3-day history of increasing shortness of breath and wheezing.  He has a history of asthma.  Reports he has had a nonproductive cough for also 3 days.  He reports he has had no night sweats hemoptysis he denies pleuritic chest discomfort he reports no leg pain or swelling he denies palpitations or diaphoresis.  He states he is out of his albuterol inhaler.  He reports subjective fever as well as chills          Review of Systems   Constitutional: Positive for chills, fatigue and fever.   HENT: Negative for sore throat and trouble swallowing.    Eyes: Negative for discharge.   Respiratory: Positive for cough, chest tightness, shortness of breath and wheezing.    Cardiovascular: Negative for chest pain, palpitations and leg swelling.   Gastrointestinal: Negative for nausea.   Skin: Negative for rash.   Allergic/Immunologic: Positive for environmental allergies.   Hematological: Does not bruise/bleed easily.   All other systems reviewed and are negative.      Past Medical History:   Diagnosis Date   • Asthma    • COPD (chronic obstructive pulmonary disease) (CMS/[facility])        Allergies   Allergen Reactions   • Adhesive Tape Hives       Past Surgical History:   Procedure Laterality Date   • EYE SURGERY         No family history on file.    Social History     Socioeconomic History   • Marital status: Single   Tobacco Use   • Smoking status: Never Smoker   • Smokeless tobacco: Never Used   Vaping Use   • Vaping Use: Never used   Substance and Sexual Activity   • Alcohol use: Yes     Comment: drinks 1/5 every 3 days   • Drug use: Not Currently     Types: Marijuana   • Sexual activity: Defer   Reports no unusual food water travel or activity        Objective   Physical Exam  Alert Shant Coma Scale 15   HEENT: Pupils equal and reactive to light. Conjunctivae are not injected. normal tympanic membranes. Oropharynx and nares are normal.   Neck: Supple. Midline trachea. No JVD. No  goiter.   Chest: Wheezes are noted bilaterally there are left-sided rales identified no accessory muscle use or retractions and equal breath sounds bilaterally regular rate and rhythm without murmur or rub.   Abdomen: Positive bowel sounds nontender nondistended. No rebound or peritoneal signs. No CVA tenderness.   Extremities no clubbing cyanosis or edema motor sensory exam is normal the full range of motion is intact   skin: Warm and dry, no rashes or petechia.   Lymphatic: No regional lymphadenopathy. No calf pain, swelling or Waldemar's sign    Procedures           ED Course           Labs Reviewed   COMPREHENSIVE METABOLIC PANEL - Abnormal; Notable for the following components:       Result Value    Creatinine 0.75 (*)     Sodium 134 (*)     Calcium 8.3 (*)     AST (SGOT) 63 (*)     All other components within normal limits    Narrative:     GFR Normal >60  Chronic Kidney Disease <60  Kidney Failure <15     APTT - Abnormal; Notable for the following components:    PTT 31.3 (*)     All other components within normal limits   CBC WITH AUTO DIFFERENTIAL - Abnormal; Notable for the following components:    Lymphocyte % 17.3 (*)     All other components within normal limits   COVID-19,CEPHEID/MIGUEL,COR/EKTA/PAD/JOI IN-HOUSE,NP SWAB IN TRANSPORT MEDIA 3-4 HR TAT, RT-PCR - Normal    Narrative:     Fact sheet for providers: https://www.fda.gov/media/664491/download     Fact sheet for patients: https://www.fda.gov/media/095512/download  Fact sheet for providers: https://www.fda.gov/media/582655/download    Fact sheet for patients: https://www.fda.gov/media/313817/download    Test performed by PCR.   PROTIME-INR - Normal   BNP (IN-HOUSE) - Normal    Narrative:     Among patients with dyspnea, NT-proBNP is highly sensitive for the detection of acute congestive heart failure. In addition NT-proBNP of <300 pg/ml effectively rules out acute congestive heart failure with 99% negative predictive value.    Results may be falsely  decreased if patient taking Biotin.     TROPONIN (IN-HOUSE) - Normal    Narrative:     Troponin T Reference Range:  <= 0.03 ng/mL-   Negative for AMI  >0.03 ng/mL-     Abnormal for myocardial necrosis.  Clinicians would have to utilize clinical acumen, EKG, Troponin and serial changes to determine if it is an Acute Myocardial Infarction or myocardial injury due to an underlying chronic condition.       Results may be falsely decreased if patient taking Biotin.     CBC AND DIFFERENTIAL    Narrative:     The following orders were created for panel order CBC & Differential.  Procedure                               Abnormality         Status                     ---------                               -----------         ------                     CBC Auto Differential[[id number]]        Abnormal            Final result                 Please view results for these tests on the individual orders.     Medications   cefTRIAXone (ROCEPHIN) 1 g in sodium chloride 0.9 % 100 mL IVPB (has no administration in time range)   albuterol sulfate HFA (PROVENTIL HFA;VENTOLIN HFA;PROAIR HFA) inhaler 6 puff (6 puffs Inhalation Given 6/24/22 1303)   methylPREDNISolone sodium succinate (SOLU-Medrol) injection 80 mg (80 mg Intramuscular Given 6/24/22 1243)   sodium chloride 0.9 % bolus 250 mL (0 mL Intravenous Stopped 6/24/22 1329)   iopamidol (ISOVUE-370) 76 % injection 100 mL (100 mL Intravenous Given 6/24/22 1327)   ipratropium-albuterol (DUO-NEB) nebulizer solution 3 mL (3 mL Nebulization Given 6/24/22 1400)     XR Chest 1 View    Result Date: 6/24/2022    1.  Faint patchy airspace disease within the left lung base which may be secondary to pneumonia.  No other acute cardiopulmonary disease identified.   Electronically Signed By-Gibran Wright MD On:6/24/2022 1:04 PM This report was finalized on [id number] by  Gibran Wright MD.    CT Angiogram Chest Pulmonary Embolism    Result Date: 6/24/2022   1.  No evidence pulmonary embolus.  2.  Patchy airspace disease within the perihilar region of the left upper lobe and within the left lower lobe consistent with multifocal pneumonia.  Follow-up to complete radiographic resolution would be recommended.  Electronically Signed By-Gibran Wright MD On:6/24/2022 1:49 PM This report was finalized on [id number] by  Gibran Wright MD.                                    MDM  Number of Diagnoses or Management Options     Amount and/or Complexity of Data Reviewed  Clinical lab tests: reviewed  Tests in the radiology section of CPT®: reviewed  Discuss the patient with other providers: yes  Independent visualization of images, tracings, or specimens: yes    Risk of Complications, Morbidity, and/or Mortality  Presenting problems: high  Diagnostic procedures: high  Management options: high  General comments: Wheezing resolved with ER therapy.  The patient will be discharged with prescriptions for doxycycline, prednisone, albuterol.  He was injected with ceftriaxone prior to discharge.  The patient was stable at discharge and vocalized understanding of discharge instructions and warnings        Final diagnoses:   Community acquired pneumonia of left lower lobe of lung   Moderate acute exacerbation of asthma       ED Disposition  ED Disposition     ED Disposition   Discharge    Condition   Stable    Comment   --             PATIENT CONNECTION - [facility] [address]  [phone]             Medication List      New Prescriptions    doxycycline 100 MG capsule  Commonly known as: MONODOX  Take 1 capsule by mouth 2 (Two) Times a Day.     predniSONE 20 MG tablet  Commonly known as: DELTASONE  Take 2 tablets by mouth Daily.        Changed    * albuterol sulfate  (90 Base) MCG/ACT inhaler  Commonly known as: PROVENTIL HFA;VENTOLIN HFA;PROAIR HFA  Inhale 2 puffs Every 6 (Six) Hours As Needed for Wheezing or Shortness of Air.  What changed: Another medication with the same name was added. Make sure you  understand how and when to take each.     * albuterol sulfate  (90 Base) MCG/ACT inhaler  Commonly known as: PROVENTIL HFA;VENTOLIN HFA;PROAIR HFA  Inhale 2 puffs Every 4 (Four) Hours As Needed for Wheezing or Shortness of Air.  What changed: You were already taking a medication with the same name, and this prescription was added. Make sure you understand how and when to take each.         * This list has 2 medication(s) that are the same as other medications prescribed for you. Read the directions carefully, and ask your doctor or other care provider to review them with you.               Where to Get Your Medications      Information about where to get these medications is not yet available    Ask your nurse or doctor about these medications  · albuterol sulfate  (90 Base) MCG/ACT inhaler  · doxycycline 100 MG capsule  · predniSONE 20 MG tablet          Rudy Liao MD  06/24/22 6402

## 2022-06-24 NOTE — DISCHARGE INSTRUCTIONS
Rest next 24 hours  Tylenol or ibuprofen as needed for fever and discomfort  Continue to use maintenance inhaler and nebulizer  Medication as directed  Follow-up with primary care provider or return for worsening symptoms

## 2022-08-22 ENCOUNTER — HOSPITAL ENCOUNTER (EMERGENCY)
Facility: HOSPITAL | Age: 22
Discharge: HOME | End: 2022-08-22
Attending: EMERGENCY MEDICINE | Admitting: EMERGENCY MEDICINE

## 2022-08-22 VITALS
WEIGHT: 130 LBS | HEART RATE: 85 BPM | BODY MASS INDEX: 16.68 KG/M2 | SYSTOLIC BLOOD PRESSURE: 124 MMHG | DIASTOLIC BLOOD PRESSURE: 59 MMHG | HEIGHT: 74 IN | RESPIRATION RATE: 18 BRPM | OXYGEN SATURATION: 100 % | TEMPERATURE: 98.7 F

## 2022-08-22 DIAGNOSIS — F10.120 HANGOVER WITHOUT COMPLICATION: Primary | ICD-10-CM

## 2022-08-22 DIAGNOSIS — R10.10 PAIN OF UPPER ABDOMEN: ICD-10-CM

## 2022-08-22 DIAGNOSIS — R11.2 NAUSEA AND VOMITING, UNSPECIFIED VOMITING TYPE: ICD-10-CM

## 2022-08-22 LAB
ALBUMIN SERPL-MCNC: 5.3 G/DL (ref 3.5–5.2)
ALBUMIN/GLOB SERPL: 2.8 G/DL
ALP SERPL-CCNC: 84 U/L (ref 39–117)
ALT SERPL W P-5'-P-CCNC: 31 U/L (ref 1–41)
ANION GAP SERPL CALCULATED.3IONS-SCNC: 16 MMOL/L (ref 5–15)
AST SERPL-CCNC: 33 U/L (ref 1–40)
BILIRUB SERPL-MCNC: 0.4 MG/DL (ref 0–1.2)
BUN SERPL-MCNC: 9 MG/DL (ref 6–20)
BUN/CREAT SERPL: 11.3 (ref 7–25)
CALCIUM SPEC-SCNC: 9.6 MG/DL (ref 8.6–10.5)
CHLORIDE SERPL-SCNC: 102 MMOL/L (ref 98–107)
CO2 SERPL-SCNC: 24 MMOL/L (ref 22–29)
CREAT SERPL-MCNC: 0.8 MG/DL (ref 0.76–1.27)
EGFRCR SERPLBLD CKD-EPI 2021: 129.1 ML/MIN/1.73
GLOBULIN UR ELPH-MCNC: 1.9 GM/DL
GLUCOSE SERPL-MCNC: 114 MG/DL (ref 65–99)
HOLD SPECIMEN: NORMAL
LIPASE SERPL-CCNC: 28 U/L (ref 13–60)
POTASSIUM SERPL-SCNC: 4.2 MMOL/L (ref 3.5–5.2)
PROT SERPL-MCNC: 7.2 G/DL (ref 6–8.5)
SODIUM SERPL-SCNC: 142 MMOL/L (ref 136–145)
WHOLE BLOOD HOLD COAG: NORMAL
WHOLE BLOOD HOLD SPECIMEN: NORMAL

## 2022-08-22 PROCEDURE — 80053 COMPREHEN METABOLIC PANEL: CPT | Performed by: EMERGENCY MEDICINE

## 2022-08-22 PROCEDURE — 25010000002 ONDANSETRON PER 1 MG: Performed by: EMERGENCY MEDICINE

## 2022-08-22 PROCEDURE — 96374 THER/PROPH/DIAG INJ IV PUSH: CPT

## 2022-08-22 PROCEDURE — 99283 EMERGENCY DEPT VISIT LOW MDM: CPT

## 2022-08-22 PROCEDURE — 83690 ASSAY OF LIPASE: CPT | Performed by: EMERGENCY MEDICINE

## 2022-08-22 PROCEDURE — 96375 TX/PRO/DX INJ NEW DRUG ADDON: CPT

## 2022-08-22 RX ORDER — SODIUM CHLORIDE 0.9 % (FLUSH) 0.9 %
10 SYRINGE (ML) INJECTION AS NEEDED
Status: DISCONTINUED | OUTPATIENT
Start: 2022-08-22 | End: 2022-08-22 | Stop reason: HOSPADM

## 2022-08-22 RX ORDER — FAMOTIDINE 10 MG/ML
20 INJECTION, SOLUTION INTRAVENOUS ONCE
Status: COMPLETED | OUTPATIENT
Start: 2022-08-22 | End: 2022-08-22

## 2022-08-22 RX ORDER — ONDANSETRON 2 MG/ML
4 INJECTION INTRAMUSCULAR; INTRAVENOUS ONCE
Status: COMPLETED | OUTPATIENT
Start: 2022-08-22 | End: 2022-08-22

## 2022-08-22 RX ORDER — PROMETHAZINE HYDROCHLORIDE 25 MG/1
25 TABLET ORAL EVERY 6 HOURS PRN
Qty: 6 TABLET | Refills: 0 | Status: SHIPPED | OUTPATIENT
Start: 2022-08-22

## 2022-08-22 RX ADMIN — SODIUM CHLORIDE, POTASSIUM CHLORIDE, SODIUM LACTATE AND CALCIUM CHLORIDE 1000 ML: 600; 310; 30; 20 INJECTION, SOLUTION INTRAVENOUS at 12:14

## 2022-08-22 RX ADMIN — ONDANSETRON 4 MG: 2 INJECTION INTRAMUSCULAR; INTRAVENOUS at 12:15

## 2022-08-22 RX ADMIN — FAMOTIDINE 20 MG: 10 INJECTION INTRAVENOUS at 13:12
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
## 2022-08-22 NOTE — DISCHARGE INSTRUCTIONS
Follow-up with primary provider or [facility] [place].  No alcohol.  Seek treatment for alcohol use.  Return new or worsening symptoms.  Small amount of liquids today advance diet as tolerated

## 2023-03-23 ENCOUNTER — HOSPITAL ENCOUNTER (EMERGENCY)
Facility: HOSPITAL | Age: 23
Discharge: HOME | End: 2023-03-23
Attending: EMERGENCY MEDICINE | Admitting: EMERGENCY MEDICINE
Payer: MEDICAID

## 2023-03-23 ENCOUNTER — APPOINTMENT (OUTPATIENT)
Dept: GENERAL RADIOLOGY | Facility: HOSPITAL | Age: 23
End: 2023-03-23
Payer: MEDICAID

## 2023-03-23 VITALS
RESPIRATION RATE: 18 BRPM | WEIGHT: 140.43 LBS | HEIGHT: 74 IN | DIASTOLIC BLOOD PRESSURE: 74 MMHG | BODY MASS INDEX: 18.02 KG/M2 | HEART RATE: 89 BPM | OXYGEN SATURATION: 97 % | SYSTOLIC BLOOD PRESSURE: 113 MMHG | TEMPERATURE: 97.7 F

## 2023-03-23 DIAGNOSIS — R06.03 ACUTE RESPIRATORY DISTRESS: ICD-10-CM

## 2023-03-23 DIAGNOSIS — J45.51 SEVERE PERSISTENT ASTHMA WITH ACUTE EXACERBATION: ICD-10-CM

## 2023-03-23 DIAGNOSIS — J45.41 MODERATE PERSISTENT ASTHMA WITH ACUTE EXACERBATION: Primary | ICD-10-CM

## 2023-03-23 LAB
ALBUMIN SERPL-MCNC: 4.2 G/DL (ref 3.5–5.2)
ALBUMIN/GLOB SERPL: 1.8 G/DL
ALP SERPL-CCNC: 60 U/L (ref 39–117)
ALT SERPL W P-5'-P-CCNC: 14 U/L (ref 1–41)
ANION GAP SERPL CALCULATED.3IONS-SCNC: 8 MMOL/L (ref 5–15)
AST SERPL-CCNC: 18 U/L (ref 1–40)
BASOPHILS # BLD AUTO: 0 10*3/MM3 (ref 0–0.2)
BASOPHILS NFR BLD AUTO: 0.6 % (ref 0–1.5)
BILIRUB SERPL-MCNC: 0.3 MG/DL (ref 0–1.2)
BUN SERPL-MCNC: 14 MG/DL (ref 6–20)
BUN/CREAT SERPL: 16.9 (ref 7–25)
CALCIUM SPEC-SCNC: 9.1 MG/DL (ref 8.6–10.5)
CHLORIDE SERPL-SCNC: 105 MMOL/L (ref 98–107)
CO2 SERPL-SCNC: 29 MMOL/L (ref 22–29)
CREAT SERPL-MCNC: 0.83 MG/DL (ref 0.76–1.27)
D-LACTATE SERPL-SCNC: <0.3 MMOL/L (ref 0.3–2)
DEPRECATED RDW RBC AUTO: 45.1 FL (ref 37–54)
EGFRCR SERPLBLD CKD-EPI 2021: 127.7 ML/MIN/1.73
EOSINOPHIL # BLD AUTO: 0.3 10*3/MM3 (ref 0–0.4)
EOSINOPHIL NFR BLD AUTO: 4.4 % (ref 0.3–6.2)
ERYTHROCYTE [DISTWIDTH] IN BLOOD BY AUTOMATED COUNT: 12.8 % (ref 12.3–15.4)
GLOBULIN UR ELPH-MCNC: 2.3 GM/DL
GLUCOSE SERPL-MCNC: 75 MG/DL (ref 65–99)
HCT VFR BLD AUTO: 44.9 % (ref 37.5–51)
HGB BLD-MCNC: 15.6 G/DL (ref 13–17.7)
HOLD SPECIMEN: NORMAL
LYMPHOCYTES # BLD AUTO: 1.6 10*3/MM3 (ref 0.7–3.1)
LYMPHOCYTES NFR BLD AUTO: 23.5 % (ref 19.6–45.3)
MCH RBC QN AUTO: 33 PG (ref 26.6–33)
MCHC RBC AUTO-ENTMCNC: 34.8 G/DL (ref 31.5–35.7)
MCV RBC AUTO: 94.7 FL (ref 79–97)
MONOCYTES # BLD AUTO: 0.5 10*3/MM3 (ref 0.1–0.9)
MONOCYTES NFR BLD AUTO: 7.3 % (ref 5–12)
NEUTROPHILS NFR BLD AUTO: 4.3 10*3/MM3 (ref 1.7–7)
NEUTROPHILS NFR BLD AUTO: 64.2 % (ref 42.7–76)
NRBC BLD AUTO-RTO: 0.1 /100 WBC (ref 0–0.2)
PLATELET # BLD AUTO: 219 10*3/MM3 (ref 140–450)
PMV BLD AUTO: 8.5 FL (ref 6–12)
POTASSIUM SERPL-SCNC: 3.9 MMOL/L (ref 3.5–5.2)
PROT SERPL-MCNC: 6.5 G/DL (ref 6–8.5)
RBC # BLD AUTO: 4.74 10*6/MM3 (ref 4.14–5.8)
SODIUM SERPL-SCNC: 142 MMOL/L (ref 136–145)
TROPONIN T SERPL HS-MCNC: 6 NG/L
WBC NRBC COR # BLD: 6.7 10*3/MM3 (ref 3.4–10.8)
WHOLE BLOOD HOLD COAG: NORMAL
WHOLE BLOOD HOLD SPECIMEN: NORMAL

## 2023-03-23 PROCEDURE — 93005 ELECTROCARDIOGRAM TRACING: CPT | Performed by: EMERGENCY MEDICINE

## 2023-03-23 PROCEDURE — 99284 EMERGENCY DEPT VISIT MOD MDM: CPT

## 2023-03-23 PROCEDURE — 71045 X-RAY EXAM CHEST 1 VIEW: CPT

## 2023-03-23 PROCEDURE — 96375 TX/PRO/DX INJ NEW DRUG ADDON: CPT

## 2023-03-23 PROCEDURE — 83605 ASSAY OF LACTIC ACID: CPT

## 2023-03-23 PROCEDURE — 85025 COMPLETE CBC W/AUTO DIFF WBC: CPT | Performed by: EMERGENCY MEDICINE

## 2023-03-23 PROCEDURE — 80053 COMPREHEN METABOLIC PANEL: CPT | Performed by: EMERGENCY MEDICINE

## 2023-03-23 PROCEDURE — 87040 BLOOD CULTURE FOR BACTERIA: CPT | Performed by: EMERGENCY MEDICINE

## 2023-03-23 PROCEDURE — 84484 ASSAY OF TROPONIN QUANT: CPT | Performed by: PHYSICIAN ASSISTANT

## 2023-03-23 PROCEDURE — 94761 N-INVAS EAR/PLS OXIMETRY MLT: CPT

## 2023-03-23 PROCEDURE — 96365 THER/PROPH/DIAG IV INF INIT: CPT

## 2023-03-23 PROCEDURE — 25010000002 METHYLPREDNISOLONE PER 125 MG: Performed by: EMERGENCY MEDICINE

## 2023-03-23 PROCEDURE — 94640 AIRWAY INHALATION TREATMENT: CPT

## 2023-03-23 PROCEDURE — 25010000002 MAGNESIUM SULFATE 2 GM/50ML SOLUTION: Performed by: EMERGENCY MEDICINE

## 2023-03-23 PROCEDURE — 94799 UNLISTED PULMONARY SVC/PX: CPT

## 2023-03-23 PROCEDURE — 93005 ELECTROCARDIOGRAM TRACING: CPT

## 2023-03-23 RX ORDER — ALBUTEROL SULFATE 2.5 MG/3ML
2.5 SOLUTION RESPIRATORY (INHALATION) ONCE
Status: COMPLETED | OUTPATIENT
Start: 2023-03-23 | End: 2023-03-23

## 2023-03-23 RX ORDER — ALBUTEROL SULFATE 2.5 MG/3ML
2.5 SOLUTION RESPIRATORY (INHALATION) EVERY 4 HOURS PRN
Qty: 90 ML | Refills: 0 | Status: SHIPPED | OUTPATIENT
Start: 2023-03-23

## 2023-03-23 RX ORDER — ALBUTEROL SULFATE 90 UG/1
6 AEROSOL, METERED RESPIRATORY (INHALATION) ONCE
Status: DISCONTINUED | OUTPATIENT
Start: 2023-03-23 | End: 2023-03-23

## 2023-03-23 RX ORDER — IPRATROPIUM BROMIDE AND ALBUTEROL SULFATE 2.5; .5 MG/3ML; MG/3ML
3 SOLUTION RESPIRATORY (INHALATION) ONCE
Status: COMPLETED | OUTPATIENT
Start: 2023-03-23 | End: 2023-03-23

## 2023-03-23 RX ORDER — SODIUM CHLORIDE 0.9 % (FLUSH) 0.9 %
10 SYRINGE (ML) INJECTION AS NEEDED
Status: DISCONTINUED | OUTPATIENT
Start: 2023-03-23 | End: 2023-03-23 | Stop reason: HOSPADM

## 2023-03-23 RX ORDER — METHYLPREDNISOLONE SODIUM SUCCINATE 125 MG/2ML
125 INJECTION, POWDER, LYOPHILIZED, FOR SOLUTION INTRAMUSCULAR; INTRAVENOUS ONCE
Status: COMPLETED | OUTPATIENT
Start: 2023-03-23 | End: 2023-03-23

## 2023-03-23 RX ORDER — PREDNISONE 50 MG/1
50 TABLET ORAL DAILY
Qty: 5 TABLET | Refills: 0 | Status: SHIPPED | OUTPATIENT
Start: 2023-03-23

## 2023-03-23 RX ORDER — MAGNESIUM SULFATE HEPTAHYDRATE 40 MG/ML
2 INJECTION, SOLUTION INTRAVENOUS ONCE
Status: COMPLETED | OUTPATIENT
Start: 2023-03-23 | End: 2023-03-23

## 2023-03-23 RX ADMIN — METHYLPREDNISOLONE SODIUM SUCCINATE 125 MG: 125 INJECTION, POWDER, FOR SOLUTION INTRAMUSCULAR; INTRAVENOUS at 19:55

## 2023-03-23 RX ADMIN — ALBUTEROL SULFATE 2.5 MG: 2.5 SOLUTION RESPIRATORY (INHALATION) at 19:19

## 2023-03-23 RX ADMIN — IPRATROPIUM BROMIDE AND ALBUTEROL SULFATE 3 ML: 2.5; .5 SOLUTION RESPIRATORY (INHALATION) at 19:17

## 2023-03-23 RX ADMIN — MAGNESIUM SULFATE HEPTAHYDRATE 2 G: 2 INJECTION, SOLUTION INTRAVENOUS at 19:55

## 2023-03-23 NOTE — ED PROVIDER NOTES
Subjective        Provider in Triage Note  Patient is a 22-year-old male comes in complaining of cough and shortness of breath for the past few days.  Patient has a history of asthma/COPD and takes inhalers for this with no relief.  Patient states cough is productive.  Patient denies any fever, chills, chest pain.  Patient does report some posttussive vomiting.  Patient denies any current oral steroid course or antibiotic.        History of Present Illness  Chief complaint shortness of breath my asthma is acting up    History of present illness 22-year-old male with a history of asthma who states over the last couple days he had increasing shortness of breath he states is worse when he counted moves around lays down but better when he sitting up.  This moderate degree he has had little of a cough but no fever chills no neck pain chest pain or fevers.  He denies any leg pain or swelling no recent long car ride plane or immobilization or foreign travels no one in the home with similar illness.  No recent hospitalization or antibiotic use or steroid use.        Review of Systems   Constitutional: Negative for chills and fever.   Respiratory: Positive for cough and shortness of breath. Negative for chest tightness.    Cardiovascular: Negative for chest pain and palpitations.   Gastrointestinal: Negative for abdominal pain and vomiting.   Skin: Negative for rash and wound.   Neurological: Negative for dizziness and light-headedness.   Psychiatric/Behavioral: Negative for agitation and confusion.       Past Medical History:   Diagnosis Date   • Asthma    • COPD (chronic obstructive pulmonary disease) ([facility])        Allergies   Allergen Reactions   • Adhesive Tape Hives       Past Surgical History:   Procedure Laterality Date   • EYE SURGERY         History reviewed. No pertinent family history.    Social History     Socioeconomic History   • Marital status: Single   Tobacco Use   • Smoking status: Never   • Smokeless  tobacco: Never   Vaping Use   • Vaping Use: Never used   Substance and Sexual Activity   • Alcohol use: Yes     Comment: drinks 1/5 every 3 days   • Drug use: Not Currently     Types: Marijuana   • Sexual activity: Defer     Prior to Admission medications    Medication Sig Start Date End Date Taking? Authorizing Provider   acetaminophen (TYLENOL) 325 MG tablet Take 2 tablets by mouth Every 6 (Six) Hours As Needed for Mild Pain . 1/5/21   Jenny Williamson MD   albuterol (PROVENTIL) (2.5 MG/3ML) 0.083% nebulizer solution Take 2.5 mg by nebulization Every 4 (Four) Hours As Needed for Wheezing or Shortness of Air. 3/23/23   Huber Rios MD   albuterol sulfate  (90 Base) MCG/ACT inhaler Inhale 2 puffs Every 6 (Six) Hours As Needed for Wheezing or Shortness of Air. 8/6/20   Rudy Liao MD   albuterol sulfate  (90 Base) MCG/ACT inhaler Inhale 2 puffs Every 4 (Four) Hours As Needed for Wheezing or Shortness of Air. 6/24/22   Rudy Liao MD   azithromycin (ZITHROMAX) 250 MG tablet Take once a day for 2 days  Indications: Pneumonia 1/6/21   Jenny Williamson MD   budesonide-formoterol (SYMBICORT) 160-4.5 MCG/ACT inhaler Inhale 2 puffs 2 (Two) Times a Day. 9/23/20   Gregorio Linares MD   doxycycline (MONODOX) 100 MG capsule Take 1 capsule by mouth 2 (Two) Times a Day. 6/24/22   Rudy Liao MD   famotidine (PEPCID) 20 MG tablet Take 1 tablet by mouth 2 (Two) Times a Day. 1/5/21   Jenny Williamson MD   montelukast (SINGULAIR) 10 MG tablet Take 1 tablet by mouth Every Night. 1/5/21   Jenny Williamson MD   ondansetron (Zofran) 4 MG tablet Take 1 tablet by mouth Every 8 (Eight) Hours As Needed for Nausea or Vomiting. 1/5/21   Jenny Williamson MD   predniSONE (DELTASONE) 50 MG tablet Take 1 tablet by mouth Daily. 3/23/23   Huber Rios MD   promethazine (PHENERGAN) 25 MG tablet Take 1 tablet by mouth Every 6 (Six) Hours As Needed for Nausea or Vomiting. 8/22/22   Jayy Quintero MD       Patient states he is not  on steroids he states he is not on a nebulizer he has inhalers    Objective   Physical Exam  Constitutional is a 22-year-old male awake alert no distress triage vital signs reviewed but sats are good at 98% on room air.  HEENT extraocular muscles are intact pupils equal round reactive sclera clear neck supple no adenopathy no JVD.  No bruits lungs scattered wheezes no retractions respiratory of 18 sats 98% on room air heart regular without murmur abdomen soft without tenderness no masses extremities no edema cords or Homans' sign or evidence of DVT pulses equal upper lower extremities skin warm dry without rash neurologic awake alert follows commands without focal weakness well-appearing otherwise  Procedures           ED Course      Results for orders placed or performed during the hospital encounter of 03/23/23   Comprehensive Metabolic Panel    Specimen: Blood   Result Value Ref Range    Glucose 75 65 - 99 mg/dL    BUN 14 6 - 20 mg/dL    Creatinine 0.83 0.76 - 1.27 mg/dL    Sodium 142 136 - 145 mmol/L    Potassium 3.9 3.5 - 5.2 mmol/L    Chloride 105 98 - 107 mmol/L    CO2 29.0 22.0 - 29.0 mmol/L    Calcium 9.1 8.6 - 10.5 mg/dL    Total Protein 6.5 6.0 - 8.5 g/dL    Albumin 4.2 3.5 - 5.2 g/dL    ALT (SGPT) 14 1 - 41 U/L    AST (SGOT) 18 1 - 40 U/L    Alkaline Phosphatase 60 39 - 117 U/L    Total Bilirubin 0.3 0.0 - 1.2 mg/dL    Globulin 2.3 gm/dL    A/G Ratio 1.8 g/dL    BUN/Creatinine Ratio 16.9 7.0 - 25.0    Anion Gap 8.0 5.0 - 15.0 mmol/L    eGFR 127.7 >60.0 mL/min/1.73   CBC Auto Differential    Specimen: Blood   Result Value Ref Range    WBC 6.70 3.40 - 10.80 10*3/mm3    RBC 4.74 4.14 - 5.80 10*6/mm3    Hemoglobin 15.6 13.0 - 17.7 g/dL    Hematocrit 44.9 37.5 - 51.0 %    MCV 94.7 79.0 - 97.0 fL    MCH 33.0 26.6 - 33.0 pg    MCHC 34.8 31.5 - 35.7 g/dL    RDW 12.8 12.3 - 15.4 %    RDW-SD 45.1 37.0 - 54.0 fl    MPV 8.5 6.0 - 12.0 fL    Platelets 219 140 - 450 10*3/mm3    Neutrophil % 64.2 42.7 - 76.0 %     Lymphocyte % 23.5 19.6 - 45.3 %    Monocyte % 7.3 5.0 - 12.0 %    Eosinophil % 4.4 0.3 - 6.2 %    Basophil % 0.6 0.0 - 1.5 %    Neutrophils, Absolute 4.30 1.70 - 7.00 10*3/mm3    Lymphocytes, Absolute 1.60 0.70 - 3.10 10*3/mm3    Monocytes, Absolute 0.50 0.10 - 0.90 10*3/mm3    Eosinophils, Absolute 0.30 0.00 - 0.40 10*3/mm3    Basophils, Absolute 0.00 0.00 - 0.20 10*3/mm3    nRBC 0.1 0.0 - 0.2 /100 WBC   Single High Sensitivity Troponin T    Specimen: Blood   Result Value Ref Range    HS Troponin T 6 <15 ng/L   POC Lactate    Specimen: Blood   Result Value Ref Range    Lactate <0.3 (L) 0.3 - 2.0 mmol/L   ECG 12 Lead Dyspnea   Result Value Ref Range    QT Interval 378 ms   Lavender Top   Result Value Ref Range    Extra Tube hold for add-on    Gold Top - SST   Result Value Ref Range    Extra Tube Hold for add-ons.    Light Blue Top   Result Value Ref Range    Extra Tube Hold for add-ons.      XR Chest 1 View    Result Date: 3/23/2023  Impression: 1. No acute cardiopulmonary disease. Electronically Signed: Rajan Wright  3/23/2023 7:39 PM EDT  Workstation ID: [id number]    Medications   ipratropium-albuterol (DUO-NEB) nebulizer solution 3 mL (3 mL Nebulization Given 3/23/23 1917)   albuterol (PROVENTIL) nebulizer solution 0.083% 2.5 mg/3mL (2.5 mg Nebulization Given 3/23/23 1919)   methylPREDNISolone sodium succinate (SOLU-Medrol) injection 125 mg (125 mg Intravenous Given 3/23/23 1955)   magnesium sulfate 2g/50 mL (PREMIX) infusion (0 g Intravenous Stopped 3/23/23 2047)          EKG my interpretation normal sinus rhythm rate of 72 patient has some early repolarization QTc of 413 unchanged from previous EKG when compared to 4/21/2021                                  Medical Decision Making  Medical decision making IV established monitor placed with sinus rhythm patient was given a DuoNeb and albuterol 125 mg Solu-Medrol IV.  EKG obtained and pending read by me shows normal sinus rhythm rate of 72 some early polarization  is unchanged from previous and compared to 4/21/2021 abnormal EKG but unchanged laboratory evaluation apparently by me comprehensive metabolic profile and CBC unremarkable troponins negative lactate less than 0.3.  Chest x-ray independently by me out see any acute pneumonia or any pneumothorax or other active disease.  Patient had been given 2 g of magnesium as well.  Patient repeat exam is wanting to go home he states he feels fine on repeat exam his lungs are mostly clear occasional wheeze no retractions no use of accessories respiratory of 18 sats 98% on room air.  I do not see evidence of acute myocardial infarction DVT pulmonary embolism or dissection sepsis pneumonia pericarditis myocarditis or acute infection.  This is not a complete list of all possibilities but this is based on my history physical clinical exam and findings here in ER.  Patient states she does drink quite a bit of alcohol his liver enzymes are okay and he was counseled on that.  He was given a prescription for nebulizer and solution which he states he can get.  And prednisone at home.  We talked about what to return 20 was discharged home for outpatient management    Acute respiratory distress: chronic illness or injury  Moderate persistent asthma with acute exacerbation: acute illness or injury  Severe persistent asthma with acute exacerbation: acute illness or injury  Amount and/or Complexity of Data Reviewed  Labs: ordered. Decision-making details documented in ED Course.  Radiology: ordered and independent interpretation performed. Decision-making details documented in ED Course.  ECG/medicine tests: ordered and independent interpretation performed. Decision-making details documented in ED Course.      Risk  Prescription drug management.          Final diagnoses:   Moderate persistent asthma with acute exacerbation   Acute respiratory distress   Severe persistent asthma with acute exacerbation       ED Disposition  ED Disposition     ED  Disposition   Discharge    Condition   Stable    Comment   --             PATIENT CONNECTION - [facility] [address]  [phone]  In 1 day           Medication List      Changed    * albuterol sulfate  (90 Base) MCG/ACT inhaler  Commonly known as: PROVENTIL HFA;VENTOLIN HFA;PROAIR HFA  Inhale 2 puffs Every 6 (Six) Hours As Needed for Wheezing or Shortness of Air.  What changed: Another medication with the same name was added. Make sure you understand how and when to take each.     * albuterol sulfate  (90 Base) MCG/ACT inhaler  Commonly known as: PROVENTIL HFA;VENTOLIN HFA;PROAIR HFA  Inhale 2 puffs Every 4 (Four) Hours As Needed for Wheezing or Shortness of Air.  What changed: Another medication with the same name was added. Make sure you understand how and when to take each.     * albuterol (2.5 MG/3ML) 0.083% nebulizer solution  Commonly known as: PROVENTIL  Take 2.5 mg by nebulization Every 4 (Four) Hours As Needed for Wheezing or Shortness of Air.  What changed: You were already taking a medication with the same name, and this prescription was added. Make sure you understand how and when to take each.     predniSONE 50 MG tablet  Commonly known as: DELTASONE  Take 1 tablet by mouth Daily.  What changed:   · medication strength  · how much to take         * This list has 3 medication(s) that are the same as other medications prescribed for you. Read the directions carefully, and ask your doctor or other care provider to review them with you.            Stop    ipratropium-albuterol 0.5-2.5 mg/3 ml nebulizer  Commonly known as: DUO-NEB     methylPREDNISolone 4 MG dose pack  Commonly known as: MEDROL           Where to Get Your Medications      These medications were sent to [facility] [id number] - [place], [place] - [address] AT [facility] - [phone]  - [phone] FX  [address] [place] [address]    Phone: [phone]   · albuterol (2.5 MG/3ML) 0.083%  nebulizer solution  · predniSONE 50 MG tablet          Huber Rios MD  03/24/23 1123

## 2023-03-24 NOTE — DISCHARGE INSTRUCTIONS
Prednisone at home nebulizer and home nebulizer solution sent to your pharmacy.  Follow-up primary care doctor or listed above call tomorrow.  Return for fever increasing pain neck arm jaw pain shortness of breath coughing up blood dizziness passing out or any other new or worse problems or concerns.  Stop drinking alcohol.  Your liver enzymes were okay today.

## 2023-03-25 LAB — QT INTERVAL: 378 MS

## 2023-03-28 LAB — BACTERIA SPEC AEROBE CULT: NORMAL
